# Patient Record
Sex: MALE | Race: WHITE | URBAN - METROPOLITAN AREA
[De-identification: names, ages, dates, MRNs, and addresses within clinical notes are randomized per-mention and may not be internally consistent; named-entity substitution may affect disease eponyms.]

---

## 2017-07-05 ENCOUNTER — OFFICE VISIT (OUTPATIENT)
Dept: FAMILY MEDICINE CLINIC | Facility: CLINIC | Age: 37
End: 2017-07-05

## 2017-07-05 VITALS
DIASTOLIC BLOOD PRESSURE: 70 MMHG | HEIGHT: 72.75 IN | HEART RATE: 80 BPM | TEMPERATURE: 99 F | RESPIRATION RATE: 16 BRPM | BODY MASS INDEX: 30.75 KG/M2 | WEIGHT: 232 LBS | OXYGEN SATURATION: 98 % | SYSTOLIC BLOOD PRESSURE: 108 MMHG

## 2017-07-05 DIAGNOSIS — Z00.00 ROUTINE GENERAL MEDICAL EXAMINATION AT A HEALTH CARE FACILITY: Primary | ICD-10-CM

## 2017-07-05 PROBLEM — F52.8 PSYCHOSEXUAL DYSFUNCTION WITH INHIBITED SEXUAL EXCITEMENT: Status: ACTIVE | Noted: 2017-07-05

## 2017-07-05 PROBLEM — I83.90 ASYMPTOMATIC VARICOSE VEINS: Status: ACTIVE | Noted: 2017-07-05

## 2017-07-05 PROBLEM — N52.9 VASCULOGENIC ERECTILE DYSFUNCTION: Status: ACTIVE | Noted: 2017-07-05

## 2017-07-05 PROBLEM — F17.200 TOBACCO USE DISORDER: Status: ACTIVE | Noted: 2017-07-05

## 2017-07-05 PROCEDURE — 99395 PREV VISIT EST AGE 18-39: CPT | Performed by: FAMILY MEDICINE

## 2017-07-05 RX ORDER — SULFAMETHOXAZOLE AND TRIMETHOPRIM 800; 160 MG/1; MG/1
1 TABLET ORAL 2 TIMES DAILY
Qty: 20 TABLET | Refills: 0 | Status: SHIPPED | OUTPATIENT
Start: 2017-07-05 | End: 2017-07-15

## 2017-07-05 NOTE — PATIENT INSTRUCTIONS
Good exam today     Get back to exercise routine. Healthy nutrition       Check fasting labs in near future. - will check thyroid, chemistry , testosterone.

## 2017-07-07 NOTE — PROGRESS NOTES
Chief Complaint:   Patient presents with:  Physical: new patient to get established      HPI:   This is a 43year old male coming in for health care check   Feeling well   Discussed heart dis. Prevention and ca early detection.      Come difficulty with ere Ht 72.75\"   Wt 232 lb   SpO2 98%   BMI 30.82 kg/m²  Estimated body mass index is 30.82 kg/m² as calculated from the following:    Height as of this encounter: 72.75\". Weight as of this encounter: 232 lb. Vital signs reviewed. Appears stated age, well of today.      Problem List:  Patient Active Problem List:     Viral warts     Asymptomatic varicose veins     Personal history of methicillin resistant Staphylococcus aureus     Tobacco use disorder     Routine general medical examination at [facility]

## 2017-07-12 ENCOUNTER — LABORATORY ENCOUNTER (OUTPATIENT)
Dept: LAB | Age: 37
End: 2017-07-12
Attending: FAMILY MEDICINE
Payer: COMMERCIAL

## 2017-07-12 DIAGNOSIS — Z00.00 ROUTINE GENERAL MEDICAL EXAMINATION AT A HEALTH CARE FACILITY: ICD-10-CM

## 2017-07-12 LAB
ALBUMIN SERPL-MCNC: 3.9 G/DL (ref 3.5–4.8)
ALP LIVER SERPL-CCNC: 51 U/L (ref 45–117)
ALT SERPL-CCNC: 26 U/L (ref 17–63)
AST SERPL-CCNC: 16 U/L (ref 15–41)
BASOPHILS # BLD AUTO: 0.04 X10(3) UL (ref 0–0.1)
BASOPHILS NFR BLD AUTO: 0.6 %
BILIRUB SERPL-MCNC: 0.9 MG/DL (ref 0.1–2)
BILIRUB UR QL STRIP.AUTO: NEGATIVE
BUN BLD-MCNC: 10 MG/DL (ref 8–20)
CALCIUM BLD-MCNC: 9.2 MG/DL (ref 8.3–10.3)
CHLORIDE: 106 MMOL/L (ref 101–111)
CHOLEST SMN-MCNC: 139 MG/DL (ref ?–200)
CLARITY UR REFRACT.AUTO: CLEAR
CO2: 27 MMOL/L (ref 22–32)
COLOR UR AUTO: YELLOW
CREAT BLD-MCNC: 1.52 MG/DL (ref 0.7–1.3)
EOSINOPHIL # BLD AUTO: 0.36 X10(3) UL (ref 0–0.3)
EOSINOPHIL NFR BLD AUTO: 5.5 %
ERYTHROCYTE [DISTWIDTH] IN BLOOD BY AUTOMATED COUNT: 12 % (ref 11.5–16)
GLUCOSE BLD-MCNC: 93 MG/DL (ref 70–99)
GLUCOSE UR STRIP.AUTO-MCNC: NEGATIVE MG/DL
HCT VFR BLD AUTO: 47.8 % (ref 37–53)
HDLC SERPL-MCNC: 31 MG/DL (ref 45–?)
HDLC SERPL: 4.48 {RATIO} (ref ?–4.97)
HGB BLD-MCNC: 15.9 G/DL (ref 13–17)
IMMATURE GRANULOCYTE COUNT: 0.03 X10(3) UL (ref 0–1)
IMMATURE GRANULOCYTE RATIO %: 0.5 %
KETONES UR STRIP.AUTO-MCNC: NEGATIVE MG/DL
LDLC SERPL CALC-MCNC: 93 MG/DL (ref ?–130)
LEUKOCYTE ESTERASE UR QL STRIP.AUTO: NEGATIVE
LYMPHOCYTES # BLD AUTO: 1.46 X10(3) UL (ref 0.9–4)
LYMPHOCYTES NFR BLD AUTO: 22.2 %
M PROTEIN MFR SERPL ELPH: 7.3 G/DL (ref 6.1–8.3)
MCH RBC QN AUTO: 30.8 PG (ref 27–33.2)
MCHC RBC AUTO-ENTMCNC: 33.3 G/DL (ref 31–37)
MCV RBC AUTO: 92.5 FL (ref 80–99)
MONOCYTES # BLD AUTO: 0.44 X10(3) UL (ref 0.1–0.6)
MONOCYTES NFR BLD AUTO: 6.7 %
NEUTROPHIL ABS PRELIM: 4.25 X10 (3) UL (ref 1.3–6.7)
NEUTROPHILS # BLD AUTO: 4.25 X10(3) UL (ref 1.3–6.7)
NEUTROPHILS NFR BLD AUTO: 64.5 %
NITRITE UR QL STRIP.AUTO: NEGATIVE
NONHDLC SERPL-MCNC: 108 MG/DL (ref ?–130)
PH UR STRIP.AUTO: 5 [PH] (ref 4.5–8)
PLATELET # BLD AUTO: 220 10(3)UL (ref 150–450)
POTASSIUM SERPL-SCNC: 4.1 MMOL/L (ref 3.6–5.1)
PROT UR STRIP.AUTO-MCNC: NEGATIVE MG/DL
RBC # BLD AUTO: 5.17 X10(6)UL (ref 4.3–5.7)
RBC UR QL AUTO: NEGATIVE
RED CELL DISTRIBUTION WIDTH-SD: 41 FL (ref 35.1–46.3)
SODIUM SERPL-SCNC: 140 MMOL/L (ref 136–144)
SP GR UR STRIP.AUTO: 1.02 (ref 1–1.03)
TRIGLYCERIDES: 75 MG/DL (ref ?–150)
TSI SER-ACNC: 0.38 MIU/ML (ref 0.35–5.5)
UROBILINOGEN UR STRIP.AUTO-MCNC: <2 MG/DL
VLDL: 15 MG/DL (ref 5–40)
WBC # BLD AUTO: 6.6 X10(3) UL (ref 4–13)

## 2017-07-12 PROCEDURE — 36415 COLL VENOUS BLD VENIPUNCTURE: CPT

## 2017-07-12 PROCEDURE — 84443 ASSAY THYROID STIM HORMONE: CPT

## 2017-07-12 PROCEDURE — 81003 URINALYSIS AUTO W/O SCOPE: CPT

## 2017-07-12 PROCEDURE — 85025 COMPLETE CBC W/AUTO DIFF WBC: CPT

## 2017-07-12 PROCEDURE — 80053 COMPREHEN METABOLIC PANEL: CPT

## 2017-07-12 PROCEDURE — 80061 LIPID PANEL: CPT

## 2017-07-14 ENCOUNTER — TELEPHONE (OUTPATIENT)
Dept: FAMILY MEDICINE CLINIC | Facility: CLINIC | Age: 37
End: 2017-07-14

## 2017-07-14 PROBLEM — N28.9 ACUTE RENAL INSUFFICIENCY: Status: ACTIVE | Noted: 2017-07-14

## 2017-07-14 NOTE — TELEPHONE ENCOUNTER
Patient states that he is taking creatine and will stop taking it. Patient states that Dr. Frances Holcomb was going to check his testosterone level, please advise.

## 2017-08-23 ENCOUNTER — TELEPHONE (OUTPATIENT)
Dept: FAMILY MEDICINE CLINIC | Facility: CLINIC | Age: 37
End: 2017-08-23

## 2017-08-23 NOTE — TELEPHONE ENCOUNTER
Patient states that he discussed having his testosterone level tested with his last lab draw. Patient was informed that the order for testosterone was not order at his last visit.     He states that he would like his testosterone level check so he can st

## 2017-08-29 ENCOUNTER — TELEPHONE (OUTPATIENT)
Dept: FAMILY MEDICINE CLINIC | Facility: CLINIC | Age: 37
End: 2017-08-29

## 2017-08-29 DIAGNOSIS — F32.2 MAJOR DEPRESSIVE DISORDER, SINGLE EPISODE, SEVERE (HCC): ICD-10-CM

## 2017-08-29 DIAGNOSIS — N28.9 ACUTE RENAL INSUFFICIENCY: ICD-10-CM

## 2017-08-29 DIAGNOSIS — N52.9 VASCULOGENIC ERECTILE DYSFUNCTION, UNSPECIFIED VASCULOGENIC ERECTILE DYSFUNCTION TYPE: Primary | ICD-10-CM

## 2017-08-29 NOTE — TELEPHONE ENCOUNTER
Patient states that he is going to schedule the follow up blood work and wanted to make sure that his testosterone level will be included in the order. Please advise.

## 2017-08-30 ENCOUNTER — APPOINTMENT (OUTPATIENT)
Dept: LAB | Age: 37
End: 2017-08-30
Attending: FAMILY MEDICINE
Payer: COMMERCIAL

## 2017-08-30 DIAGNOSIS — N28.9 ACUTE RENAL INSUFFICIENCY: ICD-10-CM

## 2017-08-30 DIAGNOSIS — N52.9 VASCULOGENIC ERECTILE DYSFUNCTION, UNSPECIFIED VASCULOGENIC ERECTILE DYSFUNCTION TYPE: ICD-10-CM

## 2017-08-30 DIAGNOSIS — F32.2 MAJOR DEPRESSIVE DISORDER, SINGLE EPISODE, SEVERE (HCC): ICD-10-CM

## 2017-08-30 LAB
ALBUMIN SERPL-MCNC: 4 G/DL (ref 3.5–4.8)
ALP LIVER SERPL-CCNC: 46 U/L (ref 45–117)
ALT SERPL-CCNC: 27 U/L (ref 17–63)
AST SERPL-CCNC: 19 U/L (ref 15–41)
BILIRUB SERPL-MCNC: 0.9 MG/DL (ref 0.1–2)
BUN BLD-MCNC: 8 MG/DL (ref 8–20)
CALCIUM BLD-MCNC: 9.4 MG/DL (ref 8.3–10.3)
CHLORIDE: 107 MMOL/L (ref 101–111)
CO2: 27 MMOL/L (ref 22–32)
CREAT BLD-MCNC: 1.13 MG/DL (ref 0.7–1.3)
GLUCOSE BLD-MCNC: 110 MG/DL (ref 70–99)
M PROTEIN MFR SERPL ELPH: 7.4 G/DL (ref 6.1–8.3)
POTASSIUM SERPL-SCNC: 3.8 MMOL/L (ref 3.6–5.1)
SODIUM SERPL-SCNC: 141 MMOL/L (ref 136–144)
TESTOSTERONE: 292.3 NG/DL (ref 241–827)
TSI SER-ACNC: 0.47 MIU/ML (ref 0.35–5.5)

## 2017-08-30 PROCEDURE — 84443 ASSAY THYROID STIM HORMONE: CPT

## 2017-08-30 PROCEDURE — 80053 COMPREHEN METABOLIC PANEL: CPT

## 2017-08-30 PROCEDURE — 36415 COLL VENOUS BLD VENIPUNCTURE: CPT

## 2017-08-30 PROCEDURE — 84403 ASSAY OF TOTAL TESTOSTERONE: CPT

## 2017-08-31 ENCOUNTER — TELEPHONE (OUTPATIENT)
Dept: FAMILY MEDICINE CLINIC | Facility: CLINIC | Age: 37
End: 2017-08-31

## 2017-08-31 NOTE — TELEPHONE ENCOUNTER
----- Message from Iain Tobias MD sent at 8/31/2017  6:47 AM CDT -----  Labs reviewed   Testosterone level normal   Kidney function improved. Recommend continue with drinking plenty of water . Continue off high protein diet and protein drinks.  Recom

## 2017-08-31 NOTE — TELEPHONE ENCOUNTER
Patient informed of the below results and recommenations. Patient will c/b to schedule a lab appt in 6 months.   Alejandro Arreaga, 08/31/17, 11:24 AM

## 2017-09-05 ENCOUNTER — TELEPHONE (OUTPATIENT)
Dept: FAMILY MEDICINE CLINIC | Facility: CLINIC | Age: 37
End: 2017-09-05

## 2017-09-05 NOTE — TELEPHONE ENCOUNTER
Patient states that he was put on a medication some time ago for ED and stopped taking it because it did not really need it. He states that he would like to start taking the viagra again. Patient states that it is alright to wait until tomorrow.   Please

## 2017-09-08 RX ORDER — SILDENAFIL 50 MG/1
50 TABLET, FILM COATED ORAL
Qty: 8 TABLET | Refills: 3 | Status: SHIPPED | OUTPATIENT
Start: 2017-09-08 | End: 2017-09-11 | Stop reason: ALTCHOICE

## 2017-09-09 ENCOUNTER — TELEPHONE (OUTPATIENT)
Dept: FAMILY MEDICINE CLINIC | Facility: CLINIC | Age: 37
End: 2017-09-09

## 2017-09-09 NOTE — TELEPHONE ENCOUNTER
Prescription for Viagra was sent to [facility] in [place].  Insurance wants the generic prescription to be filled instead

## 2017-09-11 RX ORDER — TADALAFIL 20 MG/1
20 TABLET ORAL AS NEEDED
Qty: 24 TABLET | Refills: 3 | Status: SHIPPED | OUTPATIENT
Start: 2017-09-11 | End: 2020-11-12

## 2017-09-11 NOTE — TELEPHONE ENCOUNTER
Patient states that the pharmacy told him that viagra will not be covered through his insurance but, cialis will be covered. He would like to know if  Dr. Sarah Altman can send a new script to the pharmacy, please advise.

## 2017-09-12 ENCOUNTER — TELEPHONE (OUTPATIENT)
Dept: FAMILY MEDICINE CLINIC | Facility: CLINIC | Age: 37
End: 2017-09-12

## 2017-09-12 NOTE — TELEPHONE ENCOUNTER
Patient informed that he insurance will not cover the cialis and a prior auth was done to see if we can get it covered. Patient also informed that we do not have a cost saving cards. Patient to await result of prior auth.

## 2017-09-13 ENCOUNTER — TELEPHONE (OUTPATIENT)
Dept: FAMILY MEDICINE CLINIC | Facility: CLINIC | Age: 37
End: 2017-09-13

## 2017-09-13 NOTE — TELEPHONE ENCOUNTER
Patient informed that the cialis is the preferred drug for his insurance and he should try that as per Dr. Gila Fuchs.     Patient agreed and will  from the pharmacy

## 2017-09-13 NOTE — TELEPHONE ENCOUNTER
Patient states that he was following up on the viagra/cialis refill. Patient informed that the prior auth was started for the viagra and we should hear something back within 24 to 48 hours. Patient states that he understands.

## 2017-09-13 NOTE — TELEPHONE ENCOUNTER
----- Message from Idalia Rushing sent at 9/13/2017  9:44 AM CDT -----  Contact: patient   Returned your call and can be reached back at [phone].  Thank you

## 2017-09-13 NOTE — TELEPHONE ENCOUNTER
----- Message from Ute Garcia sent at 9/13/2017  9:44 AM CDT -----  Contact: patient   Returned your call and can be reached back at [phone].  Thank you

## 2018-03-12 ENCOUNTER — TELEPHONE (OUTPATIENT)
Dept: FAMILY MEDICINE CLINIC | Facility: CLINIC | Age: 38
End: 2018-03-12

## 2018-03-12 NOTE — TELEPHONE ENCOUNTER
Patient been having stomach issues since Thursday.  has had a lot of gas.  has diarrhea and  feels like a lot of water coming out. Advise patient mke appt.  needs appt after 4 he is at work. No appts available with Dr. Jamshid Delgado.

## 2018-03-28 ENCOUNTER — HOSPITAL ENCOUNTER (OUTPATIENT)
Dept: GENERAL RADIOLOGY | Age: 38
Discharge: HOME | End: 2018-03-28
Attending: FAMILY MEDICINE
Payer: COMMERCIAL

## 2018-03-28 ENCOUNTER — OFFICE VISIT (OUTPATIENT)
Dept: FAMILY MEDICINE CLINIC | Facility: CLINIC | Age: 38
End: 2018-03-28

## 2018-03-28 VITALS
SYSTOLIC BLOOD PRESSURE: 98 MMHG | BODY MASS INDEX: 30.39 KG/M2 | WEIGHT: 226.81 LBS | HEIGHT: 72.25 IN | HEART RATE: 80 BPM | TEMPERATURE: 100 F | DIASTOLIC BLOOD PRESSURE: 70 MMHG | OXYGEN SATURATION: 95 % | RESPIRATION RATE: 16 BRPM

## 2018-03-28 DIAGNOSIS — R10.84 GENERALIZED ABDOMINAL PAIN: ICD-10-CM

## 2018-03-28 DIAGNOSIS — R10.84 GENERALIZED ABDOMINAL PAIN: Primary | ICD-10-CM

## 2018-03-28 DIAGNOSIS — K52.9 GASTROENTERITIS, ACUTE: ICD-10-CM

## 2018-03-28 LAB
ALBUMIN SERPL-MCNC: 4.1 G/DL (ref 3.5–4.8)
ALP LIVER SERPL-CCNC: 52 U/L (ref 45–117)
ALT SERPL-CCNC: 25 U/L (ref 17–63)
AST SERPL-CCNC: 24 U/L (ref 15–41)
BASOPHILS # BLD AUTO: 0.03 X10(3) UL (ref 0–0.1)
BASOPHILS NFR BLD AUTO: 0.2 %
BILIRUB SERPL-MCNC: 1.1 MG/DL (ref 0.1–2)
BUN BLD-MCNC: 11 MG/DL (ref 8–20)
CALCIUM BLD-MCNC: 9.5 MG/DL (ref 8.3–10.3)
CHLORIDE: 107 MMOL/L (ref 101–111)
CO2: 24 MMOL/L (ref 22–32)
CREAT BLD-MCNC: 1.25 MG/DL (ref 0.7–1.3)
EOSINOPHIL # BLD AUTO: 0.03 X10(3) UL (ref 0–0.3)
EOSINOPHIL NFR BLD AUTO: 0.2 %
ERYTHROCYTE [DISTWIDTH] IN BLOOD BY AUTOMATED COUNT: 12.1 % (ref 11.5–16)
GLUCOSE BLD-MCNC: 130 MG/DL (ref 70–99)
HCT VFR BLD AUTO: 47.8 % (ref 37–53)
HGB BLD-MCNC: 16.6 G/DL (ref 13–17)
IMMATURE GRANULOCYTE COUNT: 0.05 X10(3) UL (ref 0–1)
IMMATURE GRANULOCYTE RATIO %: 0.4 %
LYMPHOCYTES # BLD AUTO: 0.31 X10(3) UL (ref 0.9–4)
LYMPHOCYTES NFR BLD AUTO: 2.3 %
M PROTEIN MFR SERPL ELPH: 7.7 G/DL (ref 6.1–8.3)
MCH RBC QN AUTO: 32 PG (ref 27–33.2)
MCHC RBC AUTO-ENTMCNC: 34.7 G/DL (ref 31–37)
MCV RBC AUTO: 92.1 FL (ref 80–99)
MONOCYTES # BLD AUTO: 0.39 X10(3) UL (ref 0.1–1)
MONOCYTES NFR BLD AUTO: 2.8 %
NEUTROPHIL ABS PRELIM: 12.96 X10 (3) UL (ref 1.3–6.7)
NEUTROPHILS # BLD AUTO: 12.96 X10(3) UL (ref 1.3–6.7)
NEUTROPHILS NFR BLD AUTO: 94.1 %
PLATELET # BLD AUTO: 197 10(3)UL (ref 150–450)
POTASSIUM SERPL-SCNC: 4.1 MMOL/L (ref 3.6–5.1)
RBC # BLD AUTO: 5.19 X10(6)UL (ref 4.3–5.7)
RED CELL DISTRIBUTION WIDTH-SD: 41 FL (ref 35.1–46.3)
SODIUM SERPL-SCNC: 138 MMOL/L (ref 136–144)
WBC # BLD AUTO: 13.8 X10(3) UL (ref 4–13)

## 2018-03-28 PROCEDURE — 80053 COMPREHEN METABOLIC PANEL: CPT | Performed by: FAMILY MEDICINE

## 2018-03-28 PROCEDURE — 99214 OFFICE O/P EST MOD 30 MIN: CPT | Performed by: FAMILY MEDICINE

## 2018-03-28 PROCEDURE — 85025 COMPLETE CBC W/AUTO DIFF WBC: CPT | Performed by: FAMILY MEDICINE

## 2018-03-28 PROCEDURE — 74019 RADEX ABDOMEN 2 VIEWS: CPT | Performed by: FAMILY MEDICINE

## 2018-03-28 RX ORDER — CIPROFLOXACIN 250 MG/1
500 TABLET, FILM COATED ORAL 2 TIMES DAILY
Qty: 20 TABLET | Refills: 0 | Status: SHIPPED | OUTPATIENT
Start: 2018-03-28 | End: 2018-04-07

## 2018-03-28 NOTE — PATIENT INSTRUCTIONS
Start antibiotic     Tylenol for discomfort. Consider benadryl one tab three times a day as needed for nausea.

## 2018-03-29 ENCOUNTER — TELEPHONE (OUTPATIENT)
Dept: FAMILY MEDICINE CLINIC | Facility: CLINIC | Age: 38
End: 2018-03-29

## 2018-03-29 NOTE — TELEPHONE ENCOUNTER
----- Message from Arsen Lucas MD sent at 3/29/2018  2:11 PM CDT -----  Labs reviewed     Elevated WBC and elevated neutrophil count - consistent with bacterial infection as I had suspected at OV. Continue with current treatment regimen.

## 2018-03-29 NOTE — TELEPHONE ENCOUNTER
Informed pt of his blood work results. Pt would like to know if he still has to go for ultrasound gallbladder? Please advise.

## 2018-03-29 NOTE — TELEPHONE ENCOUNTER
I do not feel that Ultrasound is warranted at this time. No lab tests indicate any problem with gall bladder.

## 2018-03-30 NOTE — TELEPHONE ENCOUNTER
Xray report was same as discussed at [address] No acute findings. Normal gas pattern   No obstructive findings.

## 2018-03-30 NOTE — TELEPHONE ENCOUNTER
Patient informed of below. Expressed understanding. Requesting Results of Abdominal XRay. Please advise.   Melecio Morales, 03/30/18, 10:35 AM

## 2018-03-30 NOTE — TELEPHONE ENCOUNTER
----- Message from Harlo Osler sent at 3/29/2018  3:00 PM CDT -----  Contact: patient  jennifer - please call him at   [phone]

## 2018-04-11 ENCOUNTER — OFFICE VISIT (OUTPATIENT)
Dept: FAMILY MEDICINE CLINIC | Facility: CLINIC | Age: 38
End: 2018-04-11

## 2018-04-11 VITALS
BODY MASS INDEX: 29.5 KG/M2 | TEMPERATURE: 98 F | HEIGHT: 72.75 IN | WEIGHT: 222.63 LBS | SYSTOLIC BLOOD PRESSURE: 126 MMHG | OXYGEN SATURATION: 95 % | DIASTOLIC BLOOD PRESSURE: 72 MMHG | HEART RATE: 88 BPM | RESPIRATION RATE: 16 BRPM

## 2018-04-11 DIAGNOSIS — D72.825 BANDEMIA: ICD-10-CM

## 2018-04-11 DIAGNOSIS — K52.9 GASTROENTERITIS, ACUTE: Primary | ICD-10-CM

## 2018-04-11 PROCEDURE — 99213 OFFICE O/P EST LOW 20 MIN: CPT | Performed by: FAMILY MEDICINE

## 2018-04-11 NOTE — PROGRESS NOTES
Chief Complaint:   Patient presents with:   Follow - Up  Lump: On left side of chest      HPI:   This is a 42year old male coming in for follow-up care regarding his abdominal discomfort suspected gastroenteritis with bacterial cause with elevated white bl Medical History (reviewed - no changes required):   ED  MRSA nares  Depression     Surgical History (reviewed - no changes required):   L Ankle Surgery  17 teeth removed 1/12     Family History (reviewed - no changes required):   Father alive age 54; found alert, awake and oriented, well developed, well nourished  male   , ill appearing , emesis in room.     HEENT:  Head:  Normocephalic, atraumatic     NECK: Supple,  no JVD, no thyromegaly.   SKIN: No rashes, no skin lesion, no bruising, good turgor.     HEAR

## 2018-04-11 NOTE — PATIENT INSTRUCTIONS
Good exam     Focus on healthy nutrition , drink plenty of water. Recheck of fasting labs in near future.  Followed by appointment

## 2018-05-24 ENCOUNTER — TELEPHONE (OUTPATIENT)
Dept: FAMILY MEDICINE CLINIC | Facility: CLINIC | Age: 38
End: 2018-05-24

## 2018-07-31 ENCOUNTER — TELEPHONE (OUTPATIENT)
Dept: FAMILY MEDICINE CLINIC | Facility: CLINIC | Age: 38
End: 2018-07-31

## 2018-07-31 NOTE — TELEPHONE ENCOUNTER
VANII - Pt called stating that he has been experiencing sob on exertion. He denies any chest pain. He states that Dr. Shyla Davis prescribed him an inhaler when this happened before. He states it was a few years ago.  Pt advised to [facility]

## 2018-07-31 NOTE — TELEPHONE ENCOUNTER
Patient has breathing issues when moving and at nights, before patient was using inhaler but stopped, wants to know if dr can prescribe it again or if he should make an appointment to see the dr first

## 2018-08-02 ENCOUNTER — HOSPITAL ENCOUNTER (OUTPATIENT)
Dept: GENERAL RADIOLOGY | Age: 38
Discharge: HOME | End: 2018-08-02
Attending: FAMILY MEDICINE
Payer: COMMERCIAL

## 2018-08-02 ENCOUNTER — NURSE ONLY (OUTPATIENT)
Dept: FAMILY MEDICINE CLINIC | Facility: CLINIC | Age: 38
End: 2018-08-02
Payer: COMMERCIAL

## 2018-08-02 ENCOUNTER — OFFICE VISIT (OUTPATIENT)
Dept: FAMILY MEDICINE CLINIC | Facility: CLINIC | Age: 38
End: 2018-08-02
Payer: COMMERCIAL

## 2018-08-02 VITALS
WEIGHT: 232.81 LBS | HEART RATE: 86 BPM | OXYGEN SATURATION: 99 % | RESPIRATION RATE: 18 BRPM | DIASTOLIC BLOOD PRESSURE: 86 MMHG | SYSTOLIC BLOOD PRESSURE: 112 MMHG | BODY MASS INDEX: 30.85 KG/M2 | HEIGHT: 72.75 IN | TEMPERATURE: 99 F

## 2018-08-02 DIAGNOSIS — R06.02 SHORTNESS OF BREATH: Primary | ICD-10-CM

## 2018-08-02 DIAGNOSIS — B07.9 VIRAL WARTS, UNSPECIFIED TYPE: ICD-10-CM

## 2018-08-02 DIAGNOSIS — R06.2 WHEEZING: ICD-10-CM

## 2018-08-02 DIAGNOSIS — J30.2 SEASONAL ALLERGIC RHINITIS, UNSPECIFIED TRIGGER: ICD-10-CM

## 2018-08-02 DIAGNOSIS — R06.02 SHORTNESS OF BREATH: ICD-10-CM

## 2018-08-02 PROCEDURE — 99214 OFFICE O/P EST MOD 30 MIN: CPT | Performed by: FAMILY MEDICINE

## 2018-08-02 PROCEDURE — 94060 EVALUATION OF WHEEZING: CPT | Performed by: FAMILY MEDICINE

## 2018-08-02 PROCEDURE — 71046 X-RAY EXAM CHEST 2 VIEWS: CPT | Performed by: FAMILY MEDICINE

## 2018-08-02 RX ORDER — MONTELUKAST SODIUM 10 MG/1
10 TABLET ORAL NIGHTLY
Qty: 30 TABLET | Refills: 0 | Status: SHIPPED | OUTPATIENT
Start: 2018-08-02 | End: 2018-08-29

## 2018-08-02 RX ORDER — ALBUTEROL SULFATE 90 UG/1
1 AEROSOL, METERED RESPIRATORY (INHALATION) EVERY 4 HOURS PRN
Qty: 1 INHALER | Refills: 0 | Status: SHIPPED | OUTPATIENT
Start: 2018-08-02 | End: 2018-08-29

## 2018-08-02 NOTE — PATIENT INSTRUCTIONS
Multiple warts frozen today. Start albuterol inhaler and sigulair. Check PFT and chest xray today. Return to clinic if no improvement.

## 2018-08-29 DIAGNOSIS — R06.2 WHEEZING: ICD-10-CM

## 2018-08-30 RX ORDER — MONTELUKAST SODIUM 10 MG/1
TABLET ORAL
Qty: 30 TABLET | Refills: 0 | Status: SHIPPED | OUTPATIENT
Start: 2018-08-30 | End: 2018-09-13

## 2018-08-30 NOTE — TELEPHONE ENCOUNTER
Future appt:    Last Appointment:  8/2/2018; No f/u recommended    Cholesterol, Total (mg/dL)   Date Value   07/12/2017 139   ----------  HDL Cholesterol (mg/dL)   Date Value   07/12/2017 31 (L)   ----------  LDL Cholesterol (mg/dL)   Date Value   07/12/20

## 2018-09-12 NOTE — TELEPHONE ENCOUNTER
Future appt:    Last Appointment:  8/2/2018  Cholesterol, Total (mg/dL)   Date Value   07/12/2017 139     HDL Cholesterol (mg/dL)   Date Value   07/12/2017 31 (L)     LDL Cholesterol (mg/dL)   Date Value   07/12/2017 93     Triglycerides (mg/dL)   Date Maris

## 2018-09-13 RX ORDER — MONTELUKAST SODIUM 10 MG/1
TABLET ORAL
Qty: 30 TABLET | Refills: 0 | Status: SHIPPED | OUTPATIENT
Start: 2018-09-13 | End: 2018-10-09

## 2018-09-13 NOTE — TELEPHONE ENCOUNTER
Left detailed message informing pt of the below.  Stated to call back to make appointment for physical.

## 2018-09-19 ENCOUNTER — OFFICE VISIT (OUTPATIENT)
Dept: FAMILY MEDICINE CLINIC | Facility: CLINIC | Age: 38
End: 2018-09-19
Payer: COMMERCIAL

## 2018-09-19 VITALS
BODY MASS INDEX: 29.46 KG/M2 | RESPIRATION RATE: 16 BRPM | TEMPERATURE: 98 F | WEIGHT: 232 LBS | SYSTOLIC BLOOD PRESSURE: 116 MMHG | HEIGHT: 74.5 IN | DIASTOLIC BLOOD PRESSURE: 78 MMHG | HEART RATE: 64 BPM

## 2018-09-19 DIAGNOSIS — Z00.00 HEALTH CARE MAINTENANCE: Primary | ICD-10-CM

## 2018-09-19 PROBLEM — F17.200 TOBACCO USE DISORDER: Status: RESOLVED | Noted: 2017-07-05 | Resolved: 2018-09-19

## 2018-09-19 PROCEDURE — 90686 IIV4 VACC NO PRSV 0.5 ML IM: CPT | Performed by: FAMILY MEDICINE

## 2018-09-19 PROCEDURE — 90471 IMMUNIZATION ADMIN: CPT | Performed by: FAMILY MEDICINE

## 2018-09-19 PROCEDURE — 99395 PREV VISIT EST AGE 18-39: CPT | Performed by: FAMILY MEDICINE

## 2018-09-19 NOTE — PATIENT INSTRUCTIONS
Good exam today     Continue with inhaler as needed     Continue with singulair. to help prevent asthma flare.

## 2018-09-19 NOTE — PROGRESS NOTES
Chief Complaint:   Patient presents with:  Physical: f/u on breathing issues      HPI:   This is a 42year old male coming in for healthcare check and follow-up on breathing issues patient with issues of allergy symptoms. Patient was started on Singulair. shortness of breath, wheezing, cough or sputum production. GASTROINTESTINAL:  Denies abdominal pain, nausea, vomiting, constipation, diarrhea  : denies dysuria, no urinary frequency , no discharge.   MUSCULOSKELETAL:  Denies weakness, muscle aches,   KENDRICK maintenance      Patient Instructions   Good exam today     Continue with inhaler as needed     Continue with singulair. to help prevent asthma flare. Patient/Caregiver Education: Patient/Caregiver Education: There are no barriers to learning.  Med

## 2018-10-09 RX ORDER — MONTELUKAST SODIUM 10 MG/1
TABLET ORAL
Qty: 30 TABLET | Refills: 2 | Status: SHIPPED | OUTPATIENT
Start: 2018-10-09 | End: 2019-01-21

## 2018-10-09 NOTE — TELEPHONE ENCOUNTER
Future appt:    Last Appointment:  9/19/2018; No f/u recommended    Cholesterol, Total (mg/dL)   Date Value   07/12/2017 139     HDL Cholesterol (mg/dL)   Date Value   07/12/2017 31 (L)     LDL Cholesterol (mg/dL)   Date Value   07/12/2017 93     Triglycer

## 2019-01-21 RX ORDER — MONTELUKAST SODIUM 10 MG/1
TABLET ORAL
Qty: 30 TABLET | Refills: 2 | Status: SHIPPED | OUTPATIENT
Start: 2019-01-21 | End: 2019-11-13

## 2019-01-21 NOTE — TELEPHONE ENCOUNTER
Future appt:    Last Appointment:  9/19/2018; No f/u recommended    Cholesterol, Total (mg/dL)   Date Value   07/12/2017 139     HDL Cholesterol (mg/dL)   Date Value   07/12/2017 31 (L)     LDL Cholesterol (mg/dL)   Date Value   07/12/2017 93     Triglycerides (mg/dL)   Date Value   07/12/2017 75     No results found for: EAG, A1C  Lab Results   Component Value Date    TSH 0.473 08/30/2017     Last RF:  10/9/2018    No Follow-up on file.

## 2019-04-03 ENCOUNTER — TELEPHONE (OUTPATIENT)
Dept: FAMILY MEDICINE CLINIC | Facility: CLINIC | Age: 39
End: 2019-04-03

## 2019-04-03 NOTE — TELEPHONE ENCOUNTER
Patient states he feels like there is a knot in his throat. States that it comes and goes. Patient is wondering if it could be acid reflux. Patient denies any burning in his chest or throat. States it does not get worse when he lays down.  I informed robbie

## 2019-08-01 ENCOUNTER — APPOINTMENT (OUTPATIENT)
Dept: LAB | Age: 39
End: 2019-08-01
Attending: NURSE PRACTITIONER
Payer: COMMERCIAL

## 2019-08-01 ENCOUNTER — OFFICE VISIT (OUTPATIENT)
Dept: FAMILY MEDICINE CLINIC | Facility: CLINIC | Age: 39
End: 2019-08-01
Payer: COMMERCIAL

## 2019-08-01 VITALS
HEART RATE: 73 BPM | SYSTOLIC BLOOD PRESSURE: 104 MMHG | HEIGHT: 74.5 IN | OXYGEN SATURATION: 95 % | WEIGHT: 218.81 LBS | TEMPERATURE: 99 F | BODY MASS INDEX: 27.78 KG/M2 | RESPIRATION RATE: 16 BRPM | DIASTOLIC BLOOD PRESSURE: 70 MMHG

## 2019-08-01 DIAGNOSIS — R60.9 EDEMA, PERIPHERAL: Primary | ICD-10-CM

## 2019-08-01 DIAGNOSIS — R53.83 FATIGUE, UNSPECIFIED TYPE: ICD-10-CM

## 2019-08-01 DIAGNOSIS — R06.02 SHORTNESS OF BREATH: ICD-10-CM

## 2019-08-01 LAB
ALBUMIN SERPL-MCNC: 4 G/DL (ref 3.4–5)
ALBUMIN/GLOB SERPL: 1.3 {RATIO} (ref 1–2)
ALP LIVER SERPL-CCNC: 51 U/L (ref 45–117)
ALT SERPL-CCNC: 35 U/L (ref 16–61)
ANION GAP SERPL CALC-SCNC: 7 MMOL/L (ref 0–18)
AST SERPL-CCNC: 31 U/L (ref 15–37)
BASOPHILS # BLD AUTO: 0.04 X10(3) UL (ref 0–0.2)
BASOPHILS NFR BLD AUTO: 0.4 %
BILIRUB SERPL-MCNC: 0.7 MG/DL (ref 0.1–2)
BUN BLD-MCNC: 26 MG/DL (ref 7–18)
BUN/CREAT SERPL: 19.7 (ref 10–20)
CALCIUM BLD-MCNC: 8.8 MG/DL (ref 8.5–10.1)
CHLORIDE SERPL-SCNC: 106 MMOL/L (ref 98–112)
CO2 SERPL-SCNC: 27 MMOL/L (ref 21–32)
CREAT BLD-MCNC: 1.32 MG/DL (ref 0.7–1.3)
DEPRECATED HBV CORE AB SER IA-ACNC: 98.2 NG/ML (ref 30–490)
DEPRECATED RDW RBC AUTO: 42.6 FL (ref 35.1–46.3)
EOSINOPHIL # BLD AUTO: 0.21 X10(3) UL (ref 0–0.7)
EOSINOPHIL NFR BLD AUTO: 2.4 %
ERYTHROCYTE [DISTWIDTH] IN BLOOD BY AUTOMATED COUNT: 12.1 % (ref 11–15)
GLOBULIN PLAS-MCNC: 3 G/DL (ref 2.8–4.4)
GLUCOSE BLD-MCNC: 88 MG/DL (ref 70–99)
HCT VFR BLD AUTO: 42.7 % (ref 39–53)
HGB BLD-MCNC: 14.3 G/DL (ref 13–17.5)
IMM GRANULOCYTES # BLD AUTO: 0.03 X10(3) UL (ref 0–1)
IMM GRANULOCYTES NFR BLD: 0.3 %
IRON SATURATION: 29 % (ref 20–50)
IRON SERPL-MCNC: 118 UG/DL (ref 65–175)
LYMPHOCYTES # BLD AUTO: 1.67 X10(3) UL (ref 1–4)
LYMPHOCYTES NFR BLD AUTO: 18.8 %
M PROTEIN MFR SERPL ELPH: 7 G/DL (ref 6.4–8.2)
MCH RBC QN AUTO: 32.2 PG (ref 26–34)
MCHC RBC AUTO-ENTMCNC: 33.5 G/DL (ref 31–37)
MCV RBC AUTO: 96.2 FL (ref 80–100)
MONOCYTES # BLD AUTO: 0.7 X10(3) UL (ref 0.1–1)
MONOCYTES NFR BLD AUTO: 7.9 %
NEUTROPHILS # BLD AUTO: 6.25 X10 (3) UL (ref 1.5–7.7)
NEUTROPHILS # BLD AUTO: 6.25 X10(3) UL (ref 1.5–7.7)
NEUTROPHILS NFR BLD AUTO: 70.2 %
OSMOLALITY SERPL CALC.SUM OF ELEC: 294 MOSM/KG (ref 275–295)
PLATELET # BLD AUTO: 191 10(3)UL (ref 150–450)
POTASSIUM SERPL-SCNC: 4.1 MMOL/L (ref 3.5–5.1)
RBC # BLD AUTO: 4.44 X10(6)UL (ref 4.3–5.7)
SODIUM SERPL-SCNC: 140 MMOL/L (ref 136–145)
TOTAL IRON BINDING CAPACITY: 405 UG/DL (ref 240–450)
TRANSFERRIN SERPL-MCNC: 272 MG/DL (ref 200–360)
VIT B12 SERPL-MCNC: 727 PG/ML (ref 193–986)
VIT D+METAB SERPL-MCNC: 26.1 NG/ML (ref 30–100)
WBC # BLD AUTO: 8.9 X10(3) UL (ref 4–11)

## 2019-08-01 PROCEDURE — 82728 ASSAY OF FERRITIN: CPT | Performed by: NURSE PRACTITIONER

## 2019-08-01 PROCEDURE — 83540 ASSAY OF IRON: CPT | Performed by: NURSE PRACTITIONER

## 2019-08-01 PROCEDURE — 85025 COMPLETE CBC W/AUTO DIFF WBC: CPT | Performed by: NURSE PRACTITIONER

## 2019-08-01 PROCEDURE — 36415 COLL VENOUS BLD VENIPUNCTURE: CPT | Performed by: NURSE PRACTITIONER

## 2019-08-01 PROCEDURE — 82607 VITAMIN B-12: CPT | Performed by: NURSE PRACTITIONER

## 2019-08-01 PROCEDURE — 93000 ELECTROCARDIOGRAM COMPLETE: CPT | Performed by: NURSE PRACTITIONER

## 2019-08-01 PROCEDURE — 82306 VITAMIN D 25 HYDROXY: CPT | Performed by: NURSE PRACTITIONER

## 2019-08-01 PROCEDURE — 83550 IRON BINDING TEST: CPT | Performed by: NURSE PRACTITIONER

## 2019-08-01 PROCEDURE — 99214 OFFICE O/P EST MOD 30 MIN: CPT | Performed by: NURSE PRACTITIONER

## 2019-08-01 PROCEDURE — 80053 COMPREHEN METABOLIC PANEL: CPT | Performed by: NURSE PRACTITIONER

## 2019-08-01 NOTE — PATIENT INSTRUCTIONS
EKG normal  Labs pending. Focus on healthy eating. Limit sodium in diet. If increase shortness of breath or chest pains, go to the ER. Recommend a full physical with Dr. Solange Fernando in the near future.

## 2019-08-01 NOTE — PROGRESS NOTES
HPI:    Patient ID: Rhonda Persaud is a 34year old male. HPI     States that he is really fatigued and that he is getting more swelling to his legs. Symptoms for the past week. Has been trying different diets. Has been doing keto diet.  - lost weight kg/m². Physical Exam   Constitutional: He is oriented to person, place, and time. He appears well-developed and well-nourished. No distress. HENT:   Head: Normocephalic and atraumatic.    Right Ear: Tympanic membrane, external ear and ear canal abraham

## 2019-08-02 ENCOUNTER — TELEPHONE (OUTPATIENT)
Dept: FAMILY MEDICINE CLINIC | Facility: CLINIC | Age: 39
End: 2019-08-02

## 2019-08-02 PROBLEM — E55.9 VITAMIN D DEFICIENCY: Status: ACTIVE | Noted: 2019-08-02

## 2019-08-02 RX ORDER — ERGOCALCIFEROL 1.25 MG/1
50000 CAPSULE ORAL WEEKLY
Qty: 12 CAPSULE | Refills: 0 | Status: SHIPPED | OUTPATIENT
Start: 2019-08-02 | End: 2019-11-02

## 2019-08-02 NOTE — TELEPHONE ENCOUNTER
----- Message from RASHAWN Allred sent at 8/2/2019  3:23 PM CDT -----  Please let patient know that labs show a slight decrease in kidney function. Recommend to increase water intake to at least 64 ounces daily.     Iron levels are a low normal.  Re

## 2019-08-02 NOTE — TELEPHONE ENCOUNTER
Let pt know the following below. Pt verbalized his understanding and had no other questions at this time. Rx sent.

## 2019-10-24 RX ORDER — ERGOCALCIFEROL 1.25 MG/1
CAPSULE ORAL
Qty: 12 CAPSULE | Refills: 0 | OUTPATIENT
Start: 2019-10-24

## 2019-11-13 ENCOUNTER — OFFICE VISIT (OUTPATIENT)
Dept: FAMILY MEDICINE CLINIC | Facility: CLINIC | Age: 39
End: 2019-11-13
Payer: COMMERCIAL

## 2019-11-13 ENCOUNTER — APPOINTMENT (OUTPATIENT)
Dept: LAB | Age: 39
End: 2019-11-13
Attending: NURSE PRACTITIONER
Payer: COMMERCIAL

## 2019-11-13 VITALS
SYSTOLIC BLOOD PRESSURE: 112 MMHG | OXYGEN SATURATION: 96 % | TEMPERATURE: 96 F | DIASTOLIC BLOOD PRESSURE: 80 MMHG | HEART RATE: 65 BPM | RESPIRATION RATE: 18 BRPM

## 2019-11-13 DIAGNOSIS — N28.9 ACUTE RENAL INSUFFICIENCY: ICD-10-CM

## 2019-11-13 DIAGNOSIS — N52.9 VASCULOGENIC ERECTILE DYSFUNCTION, UNSPECIFIED VASCULOGENIC ERECTILE DYSFUNCTION TYPE: ICD-10-CM

## 2019-11-13 DIAGNOSIS — Z00.00 HEALTH CARE MAINTENANCE: Primary | ICD-10-CM

## 2019-11-13 DIAGNOSIS — I83.813 VARICOSE VEINS OF BOTH LOWER EXTREMITIES WITH PAIN: ICD-10-CM

## 2019-11-13 PROCEDURE — 99396 PREV VISIT EST AGE 40-64: CPT | Performed by: NURSE PRACTITIONER

## 2019-11-13 PROCEDURE — 80053 COMPREHEN METABOLIC PANEL: CPT | Performed by: NURSE PRACTITIONER

## 2019-11-13 PROCEDURE — 83550 IRON BINDING TEST: CPT | Performed by: NURSE PRACTITIONER

## 2019-11-13 PROCEDURE — 90686 IIV4 VACC NO PRSV 0.5 ML IM: CPT | Performed by: NURSE PRACTITIONER

## 2019-11-13 PROCEDURE — 83540 ASSAY OF IRON: CPT | Performed by: NURSE PRACTITIONER

## 2019-11-13 PROCEDURE — 90471 IMMUNIZATION ADMIN: CPT | Performed by: NURSE PRACTITIONER

## 2019-11-13 PROCEDURE — 82728 ASSAY OF FERRITIN: CPT | Performed by: NURSE PRACTITIONER

## 2019-11-13 PROCEDURE — 82306 VITAMIN D 25 HYDROXY: CPT | Performed by: NURSE PRACTITIONER

## 2019-11-13 PROCEDURE — 36415 COLL VENOUS BLD VENIPUNCTURE: CPT | Performed by: NURSE PRACTITIONER

## 2019-11-13 PROCEDURE — 99213 OFFICE O/P EST LOW 20 MIN: CPT | Performed by: NURSE PRACTITIONER

## 2019-11-13 RX ORDER — MONTELUKAST SODIUM 10 MG/1
10 TABLET ORAL NIGHTLY
Qty: 90 TABLET | Refills: 3 | Status: SHIPPED | OUTPATIENT
Start: 2019-11-13 | End: 2020-11-12

## 2019-11-13 RX ORDER — SILDENAFIL 50 MG/1
50 TABLET, FILM COATED ORAL
Qty: 27 TABLET | Refills: 3 | Status: SHIPPED | OUTPATIENT
Start: 2019-11-13 | End: 2021-02-04

## 2019-11-13 NOTE — PATIENT INSTRUCTIONS
Repeat blood today- previous orders for Selena       flu shot today     Try Viagra  Follow up with Dr. Arlette Sales if not helpful. Consider compression stockings - or consult [facility].

## 2019-11-14 ENCOUNTER — TELEPHONE (OUTPATIENT)
Dept: FAMILY MEDICINE CLINIC | Facility: CLINIC | Age: 39
End: 2019-11-14

## 2019-11-14 DIAGNOSIS — R74.8 ELEVATED LIVER ENZYMES: Primary | ICD-10-CM

## 2019-11-14 NOTE — TELEPHONE ENCOUNTER
----- Message from RASHAWN Alvarez sent at 11/14/2019  9:23 AM CST -----  Please let patient know that his vitamin D and iron levels are great. Continue supplementation. Liver enzymes are extremely elevated. Please check with patient about alcohol and Tylenol usage. Thank you.

## 2019-11-14 NOTE — TELEPHONE ENCOUNTER
----- Message from RASHAWN Frazier sent at 11/14/2019  9:23 AM CST -----  Please let patient know that his vitamin D and iron levels are great. Continue supplementation. Liver enzymes are extremely elevated.   Please check with patient about alcohol

## 2019-11-14 NOTE — TELEPHONE ENCOUNTER
Spoke with patient and he was notified of results. Pt denies using any alcohol and only uses Naproxen as needed for pain. Selena Bruno notified and will order f/u labs.

## 2019-11-21 ENCOUNTER — TELEPHONE (OUTPATIENT)
Dept: FAMILY MEDICINE CLINIC | Facility: CLINIC | Age: 39
End: 2019-11-21

## 2019-11-21 DIAGNOSIS — E55.9 VITAMIN D DEFICIENCY: ICD-10-CM

## 2019-11-21 DIAGNOSIS — N28.9 DECREASED RENAL FUNCTION: ICD-10-CM

## 2019-11-21 DIAGNOSIS — R74.8 ELEVATED LIVER ENZYMES: Primary | ICD-10-CM

## 2019-11-21 NOTE — TELEPHONE ENCOUNTER
Pt notified of results and recommendations as per Selena. Pt verbalized understanding. Referral with OV notes and labs faxed to Dr. Sean Garcia.

## 2019-11-21 NOTE — TELEPHONE ENCOUNTER
----- Message from RASHAWN Henry sent at 11/21/2019 10:36 AM CST -----  Please let patient know that his liver enzymes are worse. Also he is now showing decrease in his renal function.   His hepatitis panel is normal, he does not show immunity to h

## 2019-11-26 ENCOUNTER — HOSPITAL ENCOUNTER (OUTPATIENT)
Dept: ULTRASOUND IMAGING | Age: 39
Discharge: HOME | End: 2019-11-26
Attending: NURSE PRACTITIONER
Payer: COMMERCIAL

## 2019-11-26 ENCOUNTER — TELEPHONE (OUTPATIENT)
Dept: FAMILY MEDICINE CLINIC | Facility: CLINIC | Age: 39
End: 2019-11-26

## 2019-11-26 DIAGNOSIS — N28.9 DECREASED RENAL FUNCTION: ICD-10-CM

## 2019-11-26 DIAGNOSIS — R74.8 ELEVATED LIVER ENZYMES: ICD-10-CM

## 2019-11-26 DIAGNOSIS — E55.9 VITAMIN D DEFICIENCY: ICD-10-CM

## 2019-11-26 PROCEDURE — 76700 US EXAM ABDOM COMPLETE: CPT | Performed by: NURSE PRACTITIONER

## 2019-11-26 NOTE — TELEPHONE ENCOUNTER
----- Message from RASHAWN Power sent at 11/26/2019 12:10 PM CST -----  Liver u/s is normal. Please let patient know and that he needs to see hepatologist. Also fax copy of u/s report to his hepatologist. Thank you.

## 2019-11-26 NOTE — TELEPHONE ENCOUNTER
Spoke with patient and he was notified of results and recommendations as per Selena. Pt verbalized understanding. Results faxed to Dr. Lauryn Gomze office.

## 2019-12-04 ENCOUNTER — TELEPHONE (OUTPATIENT)
Dept: SURGERY | Facility: CLINIC | Age: 39
End: 2019-12-04

## 2019-12-04 DIAGNOSIS — R79.89 ELEVATED LIVER FUNCTION TESTS: Primary | ICD-10-CM

## 2019-12-04 NOTE — TELEPHONE ENCOUNTER
Seen today in [facility] at offsite location. Suspect AST>ALT may be muscle related.  Possible supplement but suspect not since he's been on creatine/whey for years with prior normal liver tests    - Will repeat labs, including CK and GGT   - Check autoimmune

## 2020-04-03 ENCOUNTER — PATIENT MESSAGE (OUTPATIENT)
Dept: FAMILY MEDICINE CLINIC | Facility: CLINIC | Age: 40
End: 2020-04-03

## 2020-04-04 ENCOUNTER — PATIENT MESSAGE (OUTPATIENT)
Dept: FAMILY MEDICINE CLINIC | Facility: CLINIC | Age: 40
End: 2020-04-04

## 2020-04-06 NOTE — TELEPHONE ENCOUNTER
From: Eleuterio Rivera  To: Wiley Castillo MD  Sent: 4/3/2020 6:05 PM CDT  Subject: Non-Urgent Medical Question    OK. Thanks for the quick response.

## 2020-04-06 NOTE — TELEPHONE ENCOUNTER
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 My breaks only 20 min.  The hr lady at my work is named Blaine and the

## 2020-06-23 ENCOUNTER — TELEPHONE (OUTPATIENT)
Dept: FAMILY MEDICINE CLINIC | Facility: CLINIC | Age: 40
End: 2020-06-23

## 2020-06-23 NOTE — TELEPHONE ENCOUNTER
Patient states he is currently feeling fatigued and \"out of it\" like he used to prior to starting the prescription vitamin D. Reviewed chart and patient saw TERRANCE Barton in August 2019.  Labs ordered at that time showed vitamin D deficiency, and patient was

## 2020-06-23 NOTE — TELEPHONE ENCOUNTER
feeling fatigue again, last time he felt like this, doctor put him on Vitamin D  No future appointments. Saucerization Excision Additional Text (Leave Blank If You Do Not Want): The margin was drawn around the clinically apparent lesion.  Incisions were then made along these lines, in a tangential fashion, to the appropriate tissue plane and the lesion was extirpated.

## 2020-11-12 ENCOUNTER — OFFICE VISIT (OUTPATIENT)
Dept: FAMILY MEDICINE CLINIC | Facility: CLINIC | Age: 40
End: 2020-11-12
Payer: COMMERCIAL

## 2020-11-12 VITALS
SYSTOLIC BLOOD PRESSURE: 116 MMHG | TEMPERATURE: 98 F | OXYGEN SATURATION: 100 % | HEIGHT: 74.5 IN | RESPIRATION RATE: 16 BRPM | DIASTOLIC BLOOD PRESSURE: 72 MMHG | HEART RATE: 89 BPM | WEIGHT: 231 LBS | BODY MASS INDEX: 29.33 KG/M2

## 2020-11-12 DIAGNOSIS — Z00.00 ROUTINE HEALTH MAINTENANCE: Primary | ICD-10-CM

## 2020-11-12 PROCEDURE — 3074F SYST BP LT 130 MM HG: CPT | Performed by: NURSE PRACTITIONER

## 2020-11-12 PROCEDURE — 3008F BODY MASS INDEX DOCD: CPT | Performed by: NURSE PRACTITIONER

## 2020-11-12 PROCEDURE — 3078F DIAST BP <80 MM HG: CPT | Performed by: NURSE PRACTITIONER

## 2020-11-12 PROCEDURE — 99396 PREV VISIT EST AGE 40-64: CPT | Performed by: NURSE PRACTITIONER

## 2020-11-12 PROCEDURE — 99072 ADDL SUPL MATRL&STAF TM PHE: CPT | Performed by: NURSE PRACTITIONER

## 2020-11-12 NOTE — PATIENT INSTRUCTIONS
Routine wellness today, good exam.  Fasting labs in the next week, check with your insurance company where their preferred lab is. Flu shot updated. Routine wellness in one year.

## 2020-11-12 NOTE — PROGRESS NOTES
[facility]      HPI:   Azalia Lyn is a 41year old male who presents for an Annual Health Visit. Annual physical exam.  Had blood work drawn through work for health screening. Denies recent illness nor hospitalizations. Not on file       REVIEW OF SYSTEMS:   GENERAL HEALTH: feels well otherwise   SKIN: denies any unusual skin lesions or rashes  EYES: no visual complaints or deficits  HEENT: denies nasal congestion, sinus pain or sore throat; hearing loss negative  RESPIRA RRR; no S3, no S4; no click; murmur negative  ABDOMEN: normal active BS+, soft, nontender, nondistended; no HSM; no masses; no bruits.   GENITAL/: deferred  RECTAL: deferred  LYMPHATIC: no lymphadenopathy  MUSCULOSKELETAL: no acute synovitis upper or lowe

## 2020-11-14 ENCOUNTER — PATIENT MESSAGE (OUTPATIENT)
Dept: FAMILY MEDICINE CLINIC | Facility: CLINIC | Age: 40
End: 2020-11-14

## 2020-11-16 NOTE — TELEPHONE ENCOUNTER
From: Good Rivera  To: Eusebio De Dios MD  Sent: 11/14/2020 8:09 PM CST  Subject: Non-Urgent Medical Question    I also have been having issues with my stomach. Feel kasia naucious and not very good after eating.  I was looking up symptoms of a parasite

## 2020-11-17 NOTE — TELEPHONE ENCOUNTER
From: Enrique Rivera  To: Kai Shah MD  Sent: 11/14/2020 4:52 AM CST  Subject: Non-Urgent Medical Question    Good morning! I tried to make an appointment with you, but you're booked till next year.  I have an issue sometimes where I'll wake up and t

## 2020-11-18 ENCOUNTER — OFFICE VISIT (OUTPATIENT)
Dept: FAMILY MEDICINE CLINIC | Facility: CLINIC | Age: 40
End: 2020-11-18
Payer: COMMERCIAL

## 2020-11-18 VITALS
OXYGEN SATURATION: 94 % | RESPIRATION RATE: 16 BRPM | SYSTOLIC BLOOD PRESSURE: 114 MMHG | HEIGHT: 74.5 IN | BODY MASS INDEX: 29.08 KG/M2 | DIASTOLIC BLOOD PRESSURE: 70 MMHG | WEIGHT: 229 LBS | TEMPERATURE: 98 F | HEART RATE: 79 BPM

## 2020-11-18 DIAGNOSIS — S31.831A ANAL TEAR: ICD-10-CM

## 2020-11-18 DIAGNOSIS — K52.9 GASTROENTERITIS: Primary | ICD-10-CM

## 2020-11-18 PROCEDURE — 3078F DIAST BP <80 MM HG: CPT | Performed by: NURSE PRACTITIONER

## 2020-11-18 PROCEDURE — 99214 OFFICE O/P EST MOD 30 MIN: CPT | Performed by: NURSE PRACTITIONER

## 2020-11-18 PROCEDURE — 99072 ADDL SUPL MATRL&STAF TM PHE: CPT | Performed by: NURSE PRACTITIONER

## 2020-11-18 PROCEDURE — 3008F BODY MASS INDEX DOCD: CPT | Performed by: NURSE PRACTITIONER

## 2020-11-18 PROCEDURE — 3074F SYST BP LT 130 MM HG: CPT | Performed by: NURSE PRACTITIONER

## 2020-11-18 RX ORDER — CIPROFLOXACIN 500 MG/1
500 TABLET, FILM COATED ORAL 2 TIMES DAILY
Qty: 20 TABLET | Refills: 0 | Status: SHIPPED | OUTPATIENT
Start: 2020-11-18 | End: 2020-11-28

## 2020-11-18 RX ORDER — HYDROCORTISONE 25 MG/G
1 CREAM TOPICAL 2 TIMES DAILY
Qty: 28 G | Refills: 0 | Status: SHIPPED | OUTPATIENT
Start: 2020-11-18 | End: 2020-11-28

## 2020-11-18 RX ORDER — SACCHAROMYCES BOULARDII 250 MG
250 CAPSULE ORAL 2 TIMES DAILY
Qty: 60 CAPSULE | Refills: 0 | Status: SHIPPED | OUTPATIENT
Start: 2020-11-18 | End: 2020-12-18

## 2020-11-18 NOTE — PATIENT INSTRUCTIONS
Start Cipro 500mg twice a day for ten days; take with a little food. Take a probiotic twice a day for the next month. Notify the office by Monday if no significant improvement. Consider food allergy/sensitivity testing if no significant improvement.

## 2020-11-18 NOTE — PROGRESS NOTES
[facility] [place]  PROGRESS NOTE  Chief Complaint:   Patient presents with:  Stomach Pain  Urinary Frequency: and waking up at times in wet bed  Hemorrhoids      HPI:   This is a 41year old male coming in for bowel changes.       Patient with s Tobacco Use      Smoking status: Former Smoker        Packs/day: 2.00        Years: 14.00        Pack years: 28        Types: Cigarettes        Start date: 1997        Quit date: 2011        Years since quittin.8      Smokeless tobacco: Never Wt 229 lb (103.9 kg)   SpO2 94%   BMI 29.01 kg/m²  Estimated body mass index is 29.01 kg/m² as calculated from the following:    Height as of this encounter: 74.5\". Weight as of this encounter: 229 lb (103.9 kg).    Wt Readings from Last 6 Encounters: 250 MG Oral Cap; Take 1 capsule (250 mg total) by mouth 2 (two) times daily. Dispense: 60 capsule; Refill: 0    2.  Anal tear  May try Proctosol external cream to the site for 10 days, may also try sitz bath's to see if this helps with symptoms.  - Hydroco general medical examination at a health care facility     Vasculogenic erectile dysfunction     Acute renal insufficiency     Vitamin D deficiency      RASHAWN Lizarraga  11/18/2020  2:28 PM    This note was created utilizing Dragon speech recognition s

## 2020-11-19 ENCOUNTER — TELEPHONE (OUTPATIENT)
Dept: FAMILY MEDICINE CLINIC | Facility: CLINIC | Age: 40
End: 2020-11-19

## 2020-11-19 ENCOUNTER — LABORATORY ENCOUNTER (OUTPATIENT)
Dept: LAB | Age: 40
End: 2020-11-19
Attending: NURSE PRACTITIONER
Payer: COMMERCIAL

## 2020-11-19 DIAGNOSIS — K52.9 GASTROENTERITIS: Primary | ICD-10-CM

## 2020-11-19 DIAGNOSIS — Z00.00 ROUTINE HEALTH MAINTENANCE: ICD-10-CM

## 2020-11-19 DIAGNOSIS — Z91.018 FOOD ALLERGY: ICD-10-CM

## 2020-11-19 PROCEDURE — 80050 GENERAL HEALTH PANEL: CPT | Performed by: NURSE PRACTITIONER

## 2020-11-19 PROCEDURE — 86003 ALLG SPEC IGE CRUDE XTRC EA: CPT | Performed by: NURSE PRACTITIONER

## 2020-11-19 PROCEDURE — 36415 COLL VENOUS BLD VENIPUNCTURE: CPT | Performed by: NURSE PRACTITIONER

## 2020-11-19 PROCEDURE — 82785 ASSAY OF IGE: CPT | Performed by: NURSE PRACTITIONER

## 2020-11-19 PROCEDURE — 84439 ASSAY OF FREE THYROXINE: CPT | Performed by: NURSE PRACTITIONER

## 2020-11-19 PROCEDURE — 80061 LIPID PANEL: CPT | Performed by: NURSE PRACTITIONER

## 2020-11-19 NOTE — TELEPHONE ENCOUNTER
Patient states he is coming in this morning for labs. Would like to add food allergy testing as well. States he spoke with insurance and it will be covered 100% as long as it is coded as  \"allergy test.\"  Please advise order.

## 2020-11-19 NOTE — TELEPHONE ENCOUNTER
has BW appt today   and has coding questions on allergy testing       Future Appointments   Date Time Provider Tae Lock   11/19/2020 11:00 AM REF [place] [facility]

## 2021-02-04 ENCOUNTER — TELEPHONE (OUTPATIENT)
Dept: FAMILY MEDICINE CLINIC | Facility: CLINIC | Age: 41
End: 2021-02-04

## 2021-02-04 ENCOUNTER — OFFICE VISIT (OUTPATIENT)
Dept: FAMILY MEDICINE CLINIC | Facility: CLINIC | Age: 41
End: 2021-02-04
Payer: COMMERCIAL

## 2021-02-04 VITALS
HEART RATE: 106 BPM | BODY MASS INDEX: 29.71 KG/M2 | TEMPERATURE: 99 F | WEIGHT: 234 LBS | SYSTOLIC BLOOD PRESSURE: 120 MMHG | HEIGHT: 74.5 IN | OXYGEN SATURATION: 99 % | DIASTOLIC BLOOD PRESSURE: 64 MMHG | RESPIRATION RATE: 16 BRPM

## 2021-02-04 DIAGNOSIS — M79.89 PAIN AND SWELLING OF RIGHT LOWER EXTREMITY: Primary | ICD-10-CM

## 2021-02-04 DIAGNOSIS — M79.604 PAIN AND SWELLING OF RIGHT LOWER EXTREMITY: Primary | ICD-10-CM

## 2021-02-04 PROCEDURE — 3078F DIAST BP <80 MM HG: CPT | Performed by: NURSE PRACTITIONER

## 2021-02-04 PROCEDURE — 99214 OFFICE O/P EST MOD 30 MIN: CPT | Performed by: NURSE PRACTITIONER

## 2021-02-04 PROCEDURE — 3074F SYST BP LT 130 MM HG: CPT | Performed by: NURSE PRACTITIONER

## 2021-02-04 PROCEDURE — 3008F BODY MASS INDEX DOCD: CPT | Performed by: NURSE PRACTITIONER

## 2021-02-04 RX ORDER — IBUPROFEN 800 MG/1
800 TABLET ORAL 3 TIMES DAILY
Qty: 30 TABLET | Refills: 0 | Status: SHIPPED | OUTPATIENT
Start: 2021-02-04 | End: 2021-02-14

## 2021-02-04 NOTE — TELEPHONE ENCOUNTER
pt is feeling some pain in right leg- not sure if he should be concerned about this - wants to talk to nurse

## 2021-02-04 NOTE — PATIENT INSTRUCTIONS
STAT ultrasound of right lower extremity to rule out a DVT. They will keep you at facility and call me with results. If it is positive for a DVT, then I will have you proceed to the ER additional management.

## 2021-02-04 NOTE — PROGRESS NOTES
[facility] [place]  PROGRESS NOTE  Chief Complaint:   Patient presents with:  Leg Pain: swelling/pain right leg      HPI:   This is a 41year old male coming in for right leg pain, swelling, and redness    Patient has had swelling, redness, and gain/loss, fever, chills, or fatigue. EENT:  Eyes:  Denies eye pain, visual loss, blurred vision, double vision or yellow sclerae. INTEGUMENTARY: Redness at right lower inner extremity.   CARDIOVASCULAR:  Denies chest pain, chest pressure, chest discomfo clubbing, Full ROM, 2+ dorsalis pedis pulses bilaterally. Mild edema to the right lower extremity with erythema medially extending along the patient's varicose veins. NEURO:  No deficit, normal gait, strength and tone, sensory intact, normal reflexes. If it is positive for a DVT, then I will have you proceed to the ER additional management. Health Maintenance: There are no preventive care reminders to display for this patient.       Problem List:  Patient Active Problem List:     Viral warts

## 2021-02-04 NOTE — TELEPHONE ENCOUNTER
Right leg pain for 4 days. States it is along the inside of his leg. States there is some redness. Denies the area is warm. States he lifts weights and thinks he pulled \"something\".   Patient advised to come over right now as the office is closing at 4pm

## 2022-01-24 ENCOUNTER — PATIENT MESSAGE (OUTPATIENT)
Dept: FAMILY MEDICINE CLINIC | Facility: CLINIC | Age: 42
End: 2022-01-24

## 2022-01-24 NOTE — TELEPHONE ENCOUNTER
From: Sonia Rivera  To: Krystyna Pond MD  Sent: 1/24/2022 8:08 AM CST  Subject: Vericose veins     Good morning! Hope all. Is well.  I remember us discussing my vericose veins before and you suggested I go to vein Dr. Terrell Castaneda are getting worse and now I'm

## 2022-02-01 ENCOUNTER — TELEPHONE (OUTPATIENT)
Dept: FAMILY MEDICINE CLINIC | Facility: CLINIC | Age: 42
End: 2022-02-01

## 2022-02-01 NOTE — TELEPHONE ENCOUNTER
Pt states that he ordered a supplement through [facility] called [employer]. He reports taking 5 days of pills BID for 10 total pills. Last time he took the pill was on Saturday, 1/29/22. He reports a itchy rash that is currently on his neck. He states it has been all over his body. Rash is not raised. Pt states he has had low testosterone levels in the past. He lifts weights and was hoping to increase his testosterone levels with this supplement. Advised pt to not take any more of the [employer]. Further explained to patient that some OTC supplements are not regulated by the FDA and might not be safe to take. Explained to pt that if he has low testosterone levels, a new lab to show his current level would need to be taken and there can be therapies prescribed/managed by a physician that would be a safer alternative. Pt does have a Testosterone level from 8/30/17 with a level of 292.3 (241.0-827.0 reference range. Pt would like to know what to take for the itchiness. If an Rx is called in, he asked for it to go to the [facility] in [place]. Please advise.

## 2022-02-01 NOTE — TELEPHONE ENCOUNTER
Agree with recommendations     Recommend benadryl - 25 mg twice daily to help with itching. For 2 - 7 days.

## 2022-02-01 NOTE — TELEPHONE ENCOUNTER
started a new medication on 01-25 broke out in a rash on friday 01-28 that comes and goes, and when it comes back its in a different spot

## 2022-04-06 ENCOUNTER — OFFICE VISIT (OUTPATIENT)
Dept: FAMILY MEDICINE CLINIC | Facility: CLINIC | Age: 42
End: 2022-04-06
Payer: COMMERCIAL

## 2022-04-06 ENCOUNTER — TELEPHONE (OUTPATIENT)
Dept: FAMILY MEDICINE CLINIC | Facility: CLINIC | Age: 42
End: 2022-04-06

## 2022-04-06 VITALS
SYSTOLIC BLOOD PRESSURE: 116 MMHG | WEIGHT: 228.63 LBS | HEIGHT: 74.5 IN | HEART RATE: 66 BPM | BODY MASS INDEX: 29.03 KG/M2 | RESPIRATION RATE: 18 BRPM | OXYGEN SATURATION: 97 % | TEMPERATURE: 98 F | DIASTOLIC BLOOD PRESSURE: 80 MMHG

## 2022-04-06 DIAGNOSIS — N52.9 VASCULOGENIC ERECTILE DYSFUNCTION, UNSPECIFIED VASCULOGENIC ERECTILE DYSFUNCTION TYPE: ICD-10-CM

## 2022-04-06 DIAGNOSIS — R53.82 CHRONIC FATIGUE: ICD-10-CM

## 2022-04-06 DIAGNOSIS — E55.9 VITAMIN D DEFICIENCY: ICD-10-CM

## 2022-04-06 DIAGNOSIS — Z00.00 ROUTINE HEALTH MAINTENANCE: Primary | ICD-10-CM

## 2022-04-06 PROCEDURE — 3074F SYST BP LT 130 MM HG: CPT | Performed by: FAMILY MEDICINE

## 2022-04-06 PROCEDURE — 3079F DIAST BP 80-89 MM HG: CPT | Performed by: FAMILY MEDICINE

## 2022-04-06 PROCEDURE — 99396 PREV VISIT EST AGE 40-64: CPT | Performed by: FAMILY MEDICINE

## 2022-04-06 PROCEDURE — 3008F BODY MASS INDEX DOCD: CPT | Performed by: FAMILY MEDICINE

## 2022-04-06 RX ORDER — NAPROXEN 500 MG/1
500 TABLET ORAL 2 TIMES DAILY WITH MEALS
Qty: 40 TABLET | Refills: 1 | Status: SHIPPED | OUTPATIENT
Start: 2022-04-06

## 2022-04-06 NOTE — PATIENT INSTRUCTIONS
Good exam       Labs in near future. - early morning   Check for Biotin in any supplements used - hold for 3 days. Naprosyn for leg irritation       Recheck in 1 year.

## 2022-04-06 NOTE — TELEPHONE ENCOUNTER
Patient just had appt and states script was supposed to be sent to [facility] in [place] but pharmacy has not receieved any.

## 2022-04-06 NOTE — TELEPHONE ENCOUNTER
Pt states that he was supposed to have a prescription of Naprosyn sent in to the pharmacy. Pt states that Dr. Madhavi Hart informed him that prior to his lab work he is to be off of his supplements x 3 days. Pt asking if he should be off of his Creatine supplement and his protein powder. Pt uses protein powder for weight training. Inquired if pt's protein powder has any supplements in it. Pt reports he is unsure. Advised pt to hold off on any supplements for 3 days until his lab work is completed. Pt states that he would like Dr. Manjula Vazquez. Douglas's opinion. Please advise.

## 2022-04-06 NOTE — TELEPHONE ENCOUNTER
Naprosyn prescription sent in , in order to help leg. #2 - Creatine supplement is a mixture of amino acids making up proteins which may help the muscles. Biotin is a vitamin made up of protein - which frequently is in a protein supplement. This should be stated on the packaging but because it is not considered it is not required. Therefore, recommend stopping all supplements 3 days prior to blood testing to help improve accuracy.

## 2022-04-07 PROBLEM — R53.82 CHRONIC FATIGUE: Status: ACTIVE | Noted: 2022-04-07

## 2022-04-07 PROBLEM — N28.9 ACUTE RENAL INSUFFICIENCY: Status: RESOLVED | Noted: 2017-07-14 | Resolved: 2022-04-07

## 2022-04-14 ENCOUNTER — LABORATORY ENCOUNTER (OUTPATIENT)
Dept: LAB | Age: 42
End: 2022-04-14
Attending: FAMILY MEDICINE
Payer: COMMERCIAL

## 2022-04-14 DIAGNOSIS — Z00.00 ROUTINE HEALTH MAINTENANCE: ICD-10-CM

## 2022-04-14 DIAGNOSIS — E55.9 VITAMIN D DEFICIENCY: ICD-10-CM

## 2022-04-14 DIAGNOSIS — N52.9 VASCULOGENIC ERECTILE DYSFUNCTION, UNSPECIFIED VASCULOGENIC ERECTILE DYSFUNCTION TYPE: ICD-10-CM

## 2022-04-14 LAB
ALBUMIN SERPL-MCNC: 4.1 G/DL (ref 3.4–5)
ALBUMIN/GLOB SERPL: 1.3 {RATIO} (ref 1–2)
ALP LIVER SERPL-CCNC: 52 U/L
ALT SERPL-CCNC: 26 U/L
ANION GAP SERPL CALC-SCNC: 5 MMOL/L (ref 0–18)
AST SERPL-CCNC: 16 U/L (ref 15–37)
BASOPHILS # BLD AUTO: 0.05 X10(3) UL (ref 0–0.2)
BASOPHILS NFR BLD AUTO: 0.8 %
BILIRUB SERPL-MCNC: 0.8 MG/DL (ref 0.1–2)
BILIRUB UR QL STRIP.AUTO: NEGATIVE
BUN BLD-MCNC: 18 MG/DL (ref 7–18)
CALCIUM BLD-MCNC: 9.7 MG/DL (ref 8.5–10.1)
CHLORIDE SERPL-SCNC: 107 MMOL/L (ref 98–112)
CHOLEST SERPL-MCNC: 128 MG/DL (ref ?–200)
CLARITY UR REFRACT.AUTO: CLEAR
CO2 SERPL-SCNC: 30 MMOL/L (ref 21–32)
COLOR UR AUTO: YELLOW
CORTIS SERPL-MCNC: 20 UG/DL
CREAT BLD-MCNC: 1.3 MG/DL
EOSINOPHIL # BLD AUTO: 0.54 X10(3) UL (ref 0–0.7)
EOSINOPHIL NFR BLD AUTO: 9.1 %
ERYTHROCYTE [DISTWIDTH] IN BLOOD BY AUTOMATED COUNT: 12.1 %
FASTING PATIENT LIPID ANSWER: YES
FASTING STATUS PATIENT QL REPORTED: YES
GLOBULIN PLAS-MCNC: 3.1 G/DL (ref 2.8–4.4)
GLUCOSE BLD-MCNC: 95 MG/DL (ref 70–99)
GLUCOSE UR STRIP.AUTO-MCNC: NEGATIVE MG/DL
HCT VFR BLD AUTO: 47.2 %
HDLC SERPL-MCNC: 32 MG/DL (ref 40–59)
HGB BLD-MCNC: 15.3 G/DL
IMM GRANULOCYTES # BLD AUTO: 0.01 X10(3) UL (ref 0–1)
IMM GRANULOCYTES NFR BLD: 0.2 %
KETONES UR STRIP.AUTO-MCNC: NEGATIVE MG/DL
LDLC SERPL CALC-MCNC: 82 MG/DL (ref ?–100)
LEUKOCYTE ESTERASE UR QL STRIP.AUTO: NEGATIVE
LYMPHOCYTES # BLD AUTO: 1.49 X10(3) UL (ref 1–4)
LYMPHOCYTES NFR BLD AUTO: 25.2 %
MCH RBC QN AUTO: 31.1 PG (ref 26–34)
MCHC RBC AUTO-ENTMCNC: 32.4 G/DL (ref 31–37)
MCV RBC AUTO: 95.9 FL
MONOCYTES # BLD AUTO: 0.57 X10(3) UL (ref 0.1–1)
MONOCYTES NFR BLD AUTO: 9.6 %
NEUTROPHILS # BLD AUTO: 3.26 X10 (3) UL (ref 1.5–7.7)
NEUTROPHILS # BLD AUTO: 3.26 X10(3) UL (ref 1.5–7.7)
NEUTROPHILS NFR BLD AUTO: 55.1 %
NITRITE UR QL STRIP.AUTO: NEGATIVE
NONHDLC SERPL-MCNC: 96 MG/DL (ref ?–130)
OSMOLALITY SERPL CALC.SUM OF ELEC: 296 MOSM/KG (ref 275–295)
PH UR STRIP.AUTO: 7 [PH] (ref 5–8)
PLATELET # BLD AUTO: 183 10(3)UL (ref 150–450)
POTASSIUM SERPL-SCNC: 4.1 MMOL/L (ref 3.5–5.1)
PROT SERPL-MCNC: 7.2 G/DL (ref 6.4–8.2)
PROT UR STRIP.AUTO-MCNC: NEGATIVE MG/DL
RBC # BLD AUTO: 4.92 X10(6)UL
RBC UR QL AUTO: NEGATIVE
SODIUM SERPL-SCNC: 142 MMOL/L (ref 136–145)
SP GR UR STRIP.AUTO: 1.01 (ref 1–1.03)
TRIGL SERPL-MCNC: 70 MG/DL (ref 30–149)
TSI SER-ACNC: 0.77 MIU/ML (ref 0.36–3.74)
UROBILINOGEN UR STRIP.AUTO-MCNC: <2 MG/DL
VIT D+METAB SERPL-MCNC: 50.9 NG/ML (ref 30–100)
VLDLC SERPL CALC-MCNC: 11 MG/DL (ref 0–30)
WBC # BLD AUTO: 5.9 X10(3) UL (ref 4–11)

## 2022-04-14 PROCEDURE — 80061 LIPID PANEL: CPT

## 2022-04-14 PROCEDURE — 80053 COMPREHEN METABOLIC PANEL: CPT

## 2022-04-14 PROCEDURE — 81003 URINALYSIS AUTO W/O SCOPE: CPT

## 2022-04-14 PROCEDURE — 84443 ASSAY THYROID STIM HORMONE: CPT

## 2022-04-14 PROCEDURE — 82306 VITAMIN D 25 HYDROXY: CPT

## 2022-04-14 PROCEDURE — 84402 ASSAY OF FREE TESTOSTERONE: CPT

## 2022-04-14 PROCEDURE — 82533 TOTAL CORTISOL: CPT

## 2022-04-14 PROCEDURE — 85025 COMPLETE CBC W/AUTO DIFF WBC: CPT

## 2022-04-14 PROCEDURE — 36415 COLL VENOUS BLD VENIPUNCTURE: CPT

## 2022-04-14 PROCEDURE — 84403 ASSAY OF TOTAL TESTOSTERONE: CPT

## 2022-04-15 ENCOUNTER — PATIENT MESSAGE (OUTPATIENT)
Dept: FAMILY MEDICINE CLINIC | Facility: CLINIC | Age: 42
End: 2022-04-15

## 2022-04-15 NOTE — TELEPHONE ENCOUNTER
From: Bertha Rivera  Sent: 4/15/2022 10:35 AM CDT  To: [facility] Clinical Staff      There's something that happened after my phisical. I was eating rice and it got stuck in throat. I wasn't able to push it down with water. Was able to get it down with a big gulp. Is that acid reflux. What does Aracelis Fry suggest I can take over the counter for that?  Sometimes it feels like something stuck in there

## 2022-04-15 NOTE — TELEPHONE ENCOUNTER
From: Trevor Rivera  To: Nhi Hernandez MD  Sent: 4/15/2022 10:23 AM CDT  Subject: Question     I went for my blood work Thursday. I got some results, but I didn't see the total and free testosterone results.

## 2022-04-15 NOTE — TELEPHONE ENCOUNTER
From: Kathrin Rivera  Sent: 4/15/2022 10:30 AM CDT  To: [facility] Clinical Staff      OK thank you.

## 2022-04-20 NOTE — TELEPHONE ENCOUNTER
Pt reports that he ate some \"sticky\" rice quickly and found that it was hard to get down. He states he eats 6 meals a day so he can get enough calories in for his weight building. Pt reports he cannot afford to come in for another doctor's appointment - that money is tight. Advised pt that he should slow his eating down to see if this helps. Pt states that he does often rush meals due to eating so many of them. Also advised to drink drink water while eating his meals. Pt inquiring if he should be started on an acid reflux pill. Please advise.

## 2022-04-22 ENCOUNTER — PATIENT MESSAGE (OUTPATIENT)
Dept: FAMILY MEDICINE CLINIC | Facility: CLINIC | Age: 42
End: 2022-04-22

## 2022-04-22 LAB
TESTOSTERONE TOTAL: 516 NG/DL
TESTOSTERONE, FREE -MS/MS: 84.4 PG/ML

## 2022-04-25 NOTE — TELEPHONE ENCOUNTER
From: Tenisha Primer  To: Mary Rivera  Sent: 4/22/2022 2:36 PM CDT    Brain,    Would you be able to get a copy of the report regarding your blood clot from the [address] [place] [address] for Dr. Gerson Caballero to review? If needed, the office fax number is [phone]. Per Dr. Mirtah Rolle, please continue to take Aspirin 325 mg by mouth daily for two months. You can apply a warm compress (do not massage) to the affected area. Elevation may also help with the swelling. Please let us know if you have any further questions.     Thank you,    [facility]  [phone]

## 2022-04-25 NOTE — TELEPHONE ENCOUNTER
Patient has a widening streak of red running up his thigh and knee. Vein clinc had advised him to wear compression stockings on Friday. Advised patient to stop wearing the stockings for now and check in with vein clinic. Patient is also to have clinic fax over report from his visit on Friday. Please advise any other recommendations.

## 2022-04-25 NOTE — TELEPHONE ENCOUNTER
Patient reports he had a fever of 101 yesterday. Patient was advised by vein clinic to schedule with PCP regarding possible need for anticoagulation if redness continues to expand. Patient was advised to continue wearing the compression stockings. Vein clinic is faxing over the report at this time. Please advise regarding follow up appointment here.

## 2022-05-28 ENCOUNTER — PATIENT MESSAGE (OUTPATIENT)
Dept: FAMILY MEDICINE CLINIC | Facility: CLINIC | Age: 42
End: 2022-05-28

## 2022-05-31 ENCOUNTER — PATIENT MESSAGE (OUTPATIENT)
Dept: FAMILY MEDICINE CLINIC | Facility: CLINIC | Age: 42
End: 2022-05-31

## 2022-05-31 NOTE — TELEPHONE ENCOUNTER
From: Demond Rivera  To: RASHAWN Horne  Sent: 5/31/2022 12:14 PM CDT  Subject: Question     I sent this message to Analilia Garza ,but don't think he got it. Could you please forward this to him for me. Thanks! Back in the past when I told you my mood was not so good cause of work etc and my ocd. You put me on paroxetine. Would you still recommend that, my ocd seems to be getting worse for some reason. I can't get the prescription anymore though. Also the erectile dysfunction medicine you had me on before I can't get anymore either. Could I get a new prescription to that as well? Thanks!

## 2022-05-31 NOTE — TELEPHONE ENCOUNTER
Pt has a history of being on Sildenafil Citrate 50 mg. Please advise on Paroxetine. Future appt:    Last Appointment with provider:   4/6/2022 for Annual Physical Exam with TR and recheck in one year. Last appointment at [facility]:  4/6/2022  Cholesterol, Total (mg/dL)   Date Value   04/14/2022 128     HDL Cholesterol (mg/dL)   Date Value   04/14/2022 32 (L)     LDL Cholesterol (mg/dL)   Date Value   04/14/2022 82     Triglycerides (mg/dL)   Date Value   04/14/2022 70     No results found for: EAG, A1C  Lab Results   Component Value Date    T4F 1.0 11/19/2020    TSH 0.768 04/14/2022       No follow-ups on file.

## 2022-06-21 ENCOUNTER — PATIENT MESSAGE (OUTPATIENT)
Dept: FAMILY MEDICINE CLINIC | Facility: CLINIC | Age: 42
End: 2022-06-21

## 2022-06-21 RX ORDER — PAROXETINE 10 MG/1
10 TABLET, FILM COATED ORAL EVERY MORNING
Qty: 90 TABLET | Refills: 3 | Status: SHIPPED | OUTPATIENT
Start: 2022-06-21

## 2022-06-21 RX ORDER — SILDENAFIL 50 MG/1
50 TABLET, FILM COATED ORAL
Qty: 8 TABLET | Refills: 3 | Status: SHIPPED | OUTPATIENT
Start: 2022-06-21 | End: 2022-06-21

## 2022-06-21 RX ORDER — SILDENAFIL 50 MG/1
50 TABLET, FILM COATED ORAL
Qty: 24 TABLET | Refills: 3 | Status: SHIPPED | OUTPATIENT
Start: 2022-06-21

## 2022-06-21 NOTE — TELEPHONE ENCOUNTER
From: Dillon Rivera  To: RASHAWN Bonilla  Sent: 6/21/2022 8:53 AM CDT  Subject: Refill request    You had started me on Sildenafil citrate (viagra) in November when I seen you. I tried to refill the prescription but I guess it's past the refill date. I can't afford to make an appointment just to get a refill again. Is there anyway I can get this refilled at the [facility] in [facility]? Thanks and hope you have a good day.

## 2022-06-21 NOTE — TELEPHONE ENCOUNTER
Original prescription was given by Esau Charles at patient's physical with her on 11/13/2019. Patient has not seen Joneen Klinefelter since. Last OV was for a physical on 4/6/2022 with Dr. Antonia Shi, PCP. Please advise.

## 2022-07-26 ENCOUNTER — TELEPHONE (OUTPATIENT)
Dept: FAMILY MEDICINE CLINIC | Facility: CLINIC | Age: 42
End: 2022-07-26

## 2022-07-26 NOTE — TELEPHONE ENCOUNTER
At the time we did food allergy testing as he was having a lot of GI symptoms with recommendations to remove it from diet to see if it helped his symptoms. There is still the potential the patient will respond similarly to duck eggs. Would likely still continue to test positive.

## 2022-07-26 NOTE — TELEPHONE ENCOUNTER
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 Would like a call back.

## 2022-07-26 NOTE — TELEPHONE ENCOUNTER
Discussed with patient lab result message for Adult Food Allergy from RASHAWN Mazariegos from 11/19/2020. Pt asking if he can substitute one type of an egg for another and not have a reaction to it. Advised patient against using different types of eggs. Pt asking if he can be retested again to see if the \"allergy went away. \" He states he has removed all eggs from his diet. Explained to patient that he will most likely continue to test positive for the egg allergy. Please advise.

## 2022-09-21 ENCOUNTER — LAB ENCOUNTER (OUTPATIENT)
Dept: LAB | Age: 42
End: 2022-09-21
Attending: FAMILY MEDICINE

## 2022-09-21 ENCOUNTER — OFFICE VISIT (OUTPATIENT)
Dept: FAMILY MEDICINE CLINIC | Facility: CLINIC | Age: 42
End: 2022-09-21

## 2022-09-21 VITALS
SYSTOLIC BLOOD PRESSURE: 96 MMHG | DIASTOLIC BLOOD PRESSURE: 70 MMHG | HEART RATE: 61 BPM | OXYGEN SATURATION: 97 % | TEMPERATURE: 98 F | RESPIRATION RATE: 14 BRPM

## 2022-09-21 DIAGNOSIS — Z11.3 SCREENING EXAMINATION FOR STD (SEXUALLY TRANSMITTED DISEASE): ICD-10-CM

## 2022-09-21 DIAGNOSIS — Z11.3 SCREENING EXAMINATION FOR STD (SEXUALLY TRANSMITTED DISEASE): Primary | ICD-10-CM

## 2022-09-21 LAB
HBV SURFACE AB SER QL: NONREACTIVE
HBV SURFACE AB SERPL IA-ACNC: <3.1 MIU/ML
HBV SURFACE AG SER-ACNC: <0.1 [IU]/L
HBV SURFACE AG SERPL QL IA: NONREACTIVE
HCV AB SERPL QL IA: NONREACTIVE
T PALLIDUM AB SER QL IA: NONREACTIVE

## 2022-09-21 PROCEDURE — 86780 TREPONEMA PALLIDUM: CPT

## 2022-09-21 PROCEDURE — 87491 CHLMYD TRACH DNA AMP PROBE: CPT

## 2022-09-21 PROCEDURE — 86706 HEP B SURFACE ANTIBODY: CPT

## 2022-09-21 PROCEDURE — 3074F SYST BP LT 130 MM HG: CPT

## 2022-09-21 PROCEDURE — 87389 HIV-1 AG W/HIV-1&-2 AB AG IA: CPT

## 2022-09-21 PROCEDURE — 86695 HERPES SIMPLEX TYPE 1 TEST: CPT

## 2022-09-21 PROCEDURE — 86803 HEPATITIS C AB TEST: CPT

## 2022-09-21 PROCEDURE — 99213 OFFICE O/P EST LOW 20 MIN: CPT

## 2022-09-21 PROCEDURE — 3078F DIAST BP <80 MM HG: CPT

## 2022-09-21 PROCEDURE — 87340 HEPATITIS B SURFACE AG IA: CPT

## 2022-09-21 PROCEDURE — 86696 HERPES SIMPLEX TYPE 2 TEST: CPT

## 2022-09-21 PROCEDURE — 87591 N.GONORRHOEAE DNA AMP PROB: CPT

## 2022-09-21 RX ORDER — SILDENAFIL 50 MG/1
50 TABLET, FILM COATED ORAL
Qty: 24 TABLET | Refills: 3 | Status: SHIPPED | OUTPATIENT
Start: 2022-09-21

## 2022-09-22 ENCOUNTER — TELEPHONE (OUTPATIENT)
Dept: FAMILY MEDICINE CLINIC | Facility: CLINIC | Age: 42
End: 2022-09-22

## 2022-09-22 LAB
C TRACH DNA SPEC QL NAA+PROBE: NEGATIVE
N GONORRHOEA DNA SPEC QL NAA+PROBE: NEGATIVE

## 2022-09-22 NOTE — TELEPHONE ENCOUNTER
----- Message from RASHAWN Jose sent at 9/22/2022  4:07 PM CDT -----  Result negative. All labs negative.

## 2022-09-23 ENCOUNTER — TELEPHONE (OUTPATIENT)
Dept: FAMILY MEDICINE CLINIC | Facility: CLINIC | Age: 42
End: 2022-09-23

## 2022-09-23 LAB
HSV 1 GLYCOPROTEIN G, IGG: NEGATIVE
HSV 2 GLYCOPROTEIN G, IGG: NEGATIVE

## 2022-10-07 ENCOUNTER — TELEPHONE (OUTPATIENT)
Dept: FAMILY MEDICINE CLINIC | Facility: CLINIC | Age: 42
End: 2022-10-07

## 2022-10-07 NOTE — TELEPHONE ENCOUNTER
Called pt and informed him Dr. João Randhawa is not accepting any NP at this time. Pt verbalized understanding and thanked us for checking.

## 2022-10-11 ENCOUNTER — TELEPHONE (OUTPATIENT)
Dept: FAMILY MEDICINE CLINIC | Facility: CLINIC | Age: 42
End: 2022-10-11

## 2022-10-12 RX ORDER — MULTIVIT WITH MINERALS/LUTEIN
250 TABLET ORAL DAILY
COMMUNITY

## 2022-10-12 RX ORDER — CHLORAL HYDRATE 500 MG
1000 CAPSULE ORAL DAILY
COMMUNITY

## 2022-10-12 RX ORDER — MULTIVITAMIN
1 TABLET ORAL DAILY
COMMUNITY

## 2022-10-12 RX ORDER — UBIDECARENONE 75 MG
250 CAPSULE ORAL DAILY
COMMUNITY

## 2022-10-12 NOTE — TELEPHONE ENCOUNTER
Spoke to pt advised of recommendations below. Pt stated he already takes vitamin b complex and b-12 daily. Pt also stated he takes vitamin c, d-3, fish oil, and mvi. Advised pt those are not on his medication list.  Updated medication list.     Any additional recommendations.

## 2023-01-20 ENCOUNTER — PATIENT MESSAGE (OUTPATIENT)
Dept: FAMILY MEDICINE CLINIC | Facility: CLINIC | Age: 43
End: 2023-01-20

## 2023-01-20 ENCOUNTER — TELEPHONE (OUTPATIENT)
Dept: FAMILY MEDICINE CLINIC | Facility: CLINIC | Age: 43
End: 2023-01-20

## 2023-01-20 ENCOUNTER — OFFICE VISIT (OUTPATIENT)
Dept: FAMILY MEDICINE CLINIC | Facility: CLINIC | Age: 43
End: 2023-01-20
Payer: COMMERCIAL

## 2023-01-20 ENCOUNTER — LAB ENCOUNTER (OUTPATIENT)
Dept: LAB | Age: 43
End: 2023-01-20
Payer: COMMERCIAL

## 2023-01-20 VITALS
TEMPERATURE: 98 F | BODY MASS INDEX: 27.22 KG/M2 | HEART RATE: 63 BPM | HEIGHT: 74.5 IN | DIASTOLIC BLOOD PRESSURE: 82 MMHG | WEIGHT: 214.38 LBS | SYSTOLIC BLOOD PRESSURE: 104 MMHG | OXYGEN SATURATION: 97 % | RESPIRATION RATE: 18 BRPM

## 2023-01-20 DIAGNOSIS — R10.9 STOMACH DISCOMFORT: ICD-10-CM

## 2023-01-20 DIAGNOSIS — R10.13 EPIGASTRIC ABDOMINAL PAIN: ICD-10-CM

## 2023-01-20 DIAGNOSIS — R10.9 STOMACH DISCOMFORT: Primary | ICD-10-CM

## 2023-01-20 LAB
ALBUMIN SERPL-MCNC: 4 G/DL (ref 3.4–5)
ALBUMIN/GLOB SERPL: 1.1 {RATIO} (ref 1–2)
ALP LIVER SERPL-CCNC: 54 U/L
ALT SERPL-CCNC: 30 U/L
ANION GAP SERPL CALC-SCNC: 4 MMOL/L (ref 0–18)
AST SERPL-CCNC: 18 U/L (ref 15–37)
BASOPHILS # BLD AUTO: 0.04 X10(3) UL (ref 0–0.2)
BASOPHILS NFR BLD AUTO: 0.6 %
BILIRUB SERPL-MCNC: 0.8 MG/DL (ref 0.1–2)
BUN BLD-MCNC: 33 MG/DL (ref 7–18)
CALCIUM BLD-MCNC: 9.6 MG/DL (ref 8.5–10.1)
CHLORIDE SERPL-SCNC: 104 MMOL/L (ref 98–112)
CO2 SERPL-SCNC: 31 MMOL/L (ref 21–32)
CREAT BLD-MCNC: 1.42 MG/DL
EOSINOPHIL # BLD AUTO: 0.44 X10(3) UL (ref 0–0.7)
EOSINOPHIL NFR BLD AUTO: 6.2 %
ERYTHROCYTE [DISTWIDTH] IN BLOOD BY AUTOMATED COUNT: 12.5 %
FASTING STATUS PATIENT QL REPORTED: NO
GFR SERPLBLD BASED ON 1.73 SQ M-ARVRAT: 62 ML/MIN/1.73M2 (ref 60–?)
GLOBULIN PLAS-MCNC: 3.6 G/DL (ref 2.8–4.4)
GLUCOSE BLD-MCNC: 97 MG/DL (ref 70–99)
HCT VFR BLD AUTO: 44.3 %
HGB BLD-MCNC: 15.5 G/DL
IMM GRANULOCYTES # BLD AUTO: 0.02 X10(3) UL (ref 0–1)
IMM GRANULOCYTES NFR BLD: 0.3 %
LYMPHOCYTES # BLD AUTO: 1.96 X10(3) UL (ref 1–4)
LYMPHOCYTES NFR BLD AUTO: 27.8 %
MCH RBC QN AUTO: 32.6 PG (ref 26–34)
MCHC RBC AUTO-ENTMCNC: 35 G/DL (ref 31–37)
MCV RBC AUTO: 93.1 FL
MONOCYTES # BLD AUTO: 0.57 X10(3) UL (ref 0.1–1)
MONOCYTES NFR BLD AUTO: 8.1 %
NEUTROPHILS # BLD AUTO: 4.02 X10 (3) UL (ref 1.5–7.7)
NEUTROPHILS # BLD AUTO: 4.02 X10(3) UL (ref 1.5–7.7)
NEUTROPHILS NFR BLD AUTO: 57 %
OSMOLALITY SERPL CALC.SUM OF ELEC: 295 MOSM/KG (ref 275–295)
PLATELET # BLD AUTO: 170 10(3)UL (ref 150–450)
POTASSIUM SERPL-SCNC: 4.2 MMOL/L (ref 3.5–5.1)
PROT SERPL-MCNC: 7.6 G/DL (ref 6.4–8.2)
RBC # BLD AUTO: 4.76 X10(6)UL
SODIUM SERPL-SCNC: 139 MMOL/L (ref 136–145)
T4 FREE SERPL-MCNC: 0.8 NG/DL (ref 0.8–1.7)
TSI SER-ACNC: 0.85 MIU/ML (ref 0.36–3.74)
WBC # BLD AUTO: 7.1 X10(3) UL (ref 4–11)

## 2023-01-20 PROCEDURE — 84439 ASSAY OF FREE THYROXINE: CPT

## 2023-01-20 PROCEDURE — 99214 OFFICE O/P EST MOD 30 MIN: CPT

## 2023-01-20 PROCEDURE — 3079F DIAST BP 80-89 MM HG: CPT

## 2023-01-20 PROCEDURE — 80050 GENERAL HEALTH PANEL: CPT

## 2023-01-20 PROCEDURE — 3074F SYST BP LT 130 MM HG: CPT

## 2023-01-20 PROCEDURE — 83013 H PYLORI (C-13) BREATH: CPT

## 2023-01-20 PROCEDURE — 3008F BODY MASS INDEX DOCD: CPT

## 2023-01-20 RX ORDER — FAMOTIDINE 20 MG/1
20 TABLET, FILM COATED ORAL 2 TIMES DAILY
Qty: 100 TABLET | Refills: 0 | Status: SHIPPED | OUTPATIENT
Start: 2023-01-20 | End: 2023-03-11

## 2023-01-20 NOTE — TELEPHONE ENCOUNTER
Recommend appointment in near future - any provider     Recommend start omeprazole (OTC) - 20 mg daily - until being seen .

## 2023-01-20 NOTE — TELEPHONE ENCOUNTER
From: Kathrin Rivera  To: Heather Durand MD  Sent: 1/20/2023 6:14 AM CST  Subject: Questions     Good morning! I have been dealing with stomach problems for 3 weeks now. I got some gasx and probiotics. I've been really gassy. They don't seem to be helping. Anything else I can try or do I need maybe some antibiotics? Not sure what I should do.  Thanks

## 2023-01-20 NOTE — TELEPHONE ENCOUNTER
From: Jesus Rivera  To: Vera Wilson MD  Sent: 1/20/2023 6:14 AM CST  Subject: Questions     Good morning! I have been dealing with stomach problems for 3 weeks now. I got some gasx and probiotics. I've been really gassy. They don't seem to be helping. Anything else I can try or do I need maybe some antibiotics? Not sure what I should do.  Thanks

## 2023-01-21 LAB — H. PYLORI BREATH TEST: NEGATIVE

## 2023-01-23 RX ORDER — SILDENAFIL 50 MG/1
50 TABLET, FILM COATED ORAL
Qty: 24 TABLET | Refills: 0 | Status: SHIPPED | OUTPATIENT
Start: 2023-01-23

## 2023-01-23 NOTE — TELEPHONE ENCOUNTER
Future appt: Your appointments     Date & Time Appointment Department [facility])    Feb 15, 2023  3:00 PM CST Physical - Established with Arlet Haque MD [address], [place] ([facility])            [address], [facility] [place]  [address] [id number]  [phone]        Last Appointment with provider:   1/20/2023  Last appointment at [facility] [place]:  1/20/2023  Cholesterol, Total (mg/dL)   Date Value   04/14/2022 128     HDL Cholesterol (mg/dL)   Date Value   04/14/2022 32 (L)     LDL Cholesterol (mg/dL)   Date Value   04/14/2022 82     Triglycerides (mg/dL)   Date Value   04/14/2022 70     No results found for: EAG, A1C  Lab Results   Component Value Date    T4F 0.8 01/20/2023    TSH 0.852 01/20/2023     Last RF:  9/21/2022    No follow-ups on file.

## 2023-01-24 ENCOUNTER — PATIENT MESSAGE (OUTPATIENT)
Dept: FAMILY MEDICINE CLINIC | Facility: CLINIC | Age: 43
End: 2023-01-24

## 2023-01-25 NOTE — TELEPHONE ENCOUNTER
From: Madhavi Rivera  To: Simone Arvizu  Sent: 1/24/2023 10:46 PM CST  Subject: Question regarding Bun Level    I try to drink a gallon a day of water. That's all I drink at work all day. Could be another reason it's elevated?

## 2023-01-26 ENCOUNTER — PATIENT MESSAGE (OUTPATIENT)
Dept: FAMILY MEDICINE CLINIC | Facility: CLINIC | Age: 43
End: 2023-01-26

## 2023-01-26 ENCOUNTER — TELEPHONE (OUTPATIENT)
Dept: FAMILY MEDICINE CLINIC | Facility: CLINIC | Age: 43
End: 2023-01-26

## 2023-01-26 NOTE — TELEPHONE ENCOUNTER
The patient has opted out of following up on abnormal blood work. The patient's BUN and creatinine are elevated and have been trending up on his blood work. I recommend stopping creatine for one month and checking BUN and Creatinine in one month. Please have the patient be on the look ou for s/s of renal failure which include: swelling, generalized edema especially in the legs, flank pain, headache, increased blood pressure, fatigue, shortness of breath, dizziness, changes in urination such as oliguria and polyuria, and abnormal electrolytes. Acute renal failure may lead to chronic renal failure which may result in permanent kidney damage, and even death.

## 2023-01-26 NOTE — TELEPHONE ENCOUNTER
Pt was advised of below recommendations and results. Pt verbalized understanding. Pt was also advised that he has to make these changes so we can figure out what is making his BUN and creatine levels so elevated.

## 2023-01-26 NOTE — TELEPHONE ENCOUNTER
From: Charline Rivera  To: Ezequiel R. Reather Riedel  Sent: 1/26/2023 11:15 AM CST  Subject: Follow up     OK I will stop being stubborn. I will completely stop the creatine. I will remove 2 scoops of protein per day which brings my protein intake somewhere around 207g. I researched and found contradicting information if it's 1g per Lb of bodyweight or 1g per Kilogram for protein intake. I have my physical scheduled with [facility] February 28th. That's a little over a month from now. Sound good?

## 2023-01-26 NOTE — TELEPHONE ENCOUNTER
Pt wants to know if he should drop his protein down as well or just stop the creatine? ?    Pt was advised of all information in below messsage. Pt verbalized understanding of all below results and recommendations.     Please advise

## 2023-01-26 NOTE — TELEPHONE ENCOUNTER
At this time I would recommend lower his protein intake as the protein may have increased his BUN levels. He can continue to take creatine as it is safe. We have discussed in his office visit that he would decrease his protein powder as it may have been causing his s/s. I recommended stopping creatine to do blood work to ensure that it was the creatine that was causing an increase in his creatinine.

## 2023-01-30 ENCOUNTER — PATIENT MESSAGE (OUTPATIENT)
Dept: FAMILY MEDICINE CLINIC | Facility: CLINIC | Age: 43
End: 2023-01-30

## 2023-01-30 NOTE — TELEPHONE ENCOUNTER
From: Linda Rivera  To: Simone Thompson  Sent: 1/30/2023 11:34 AM CST  Subject: Follow up     Good afternoon. I've Been taking the prescribed medicine and see no change with my stomach. Still farting a lot and bloating. Is it possible im eating somthing im allergic to? Maybe I'm lactose and tolerant, I do eat yogurt everyday. My son is, don't know if it's hereditary. Before I made appointment with you Marybel Helms suggested omeprazole. That do the same thing as the medicine I'm on?

## 2023-01-30 NOTE — TELEPHONE ENCOUNTER
Read and noted we will wait until the patient decides what he wants to do when he comes to allergy testing

## 2023-02-05 ENCOUNTER — PATIENT MESSAGE (OUTPATIENT)
Dept: FAMILY MEDICINE CLINIC | Facility: CLINIC | Age: 43
End: 2023-02-05

## 2023-02-06 NOTE — TELEPHONE ENCOUNTER
From: Javon Rivera  To: Casi Yadav MD  Sent: 2/5/2023 5:49 AM CST  Subject: Questions     I came to the office and got a. Blood test and a bacteria test.  put me on acid reflux medicine. I haven't noticed any change. I'm still farting a lot and stomach feels like I'm constantly hungry, even after eating. I do have a physical scheduled with you the 28th I'm dealing with this issue every day. Anything else I can try to get rid of this?is it possible im eating something I've been allergic to? Lactose and tolerant maybe? I want this to go. Away, been dealing with it for awhile now. Foods I eat are old fashioned Oats, blueberries, raspberries, blackberries, strawberries, Ground Beef, turkey, [place] rice, white rice, onions, peppers, Greek yogurt, tuna, salmon from can, black beans and protein powder. These I have every single day.

## 2023-02-07 ENCOUNTER — PATIENT MESSAGE (OUTPATIENT)
Dept: FAMILY MEDICINE CLINIC | Facility: CLINIC | Age: 43
End: 2023-02-07

## 2023-02-07 NOTE — TELEPHONE ENCOUNTER
From: Luis Rivera  To: Simone Scott [place]  Sent: 2/7/2023 9:59 AM CST  Subject: Question    What's a normal glucose range? I wanna test if I'm insulin resistant. I have belly fat that just won't go away, even when I dropped to 8% body fat at 201lbs. I think from meal to meal my blood levels are still high, therefore storing more fat. I have a blood glucose test at home.

## 2023-02-07 NOTE — TELEPHONE ENCOUNTER
From: Kathleen Rivera  To: Ginny Neal MD  Sent: 2/5/2023 6:14 AM CST  Subject: Question    Sorry for all the messages. I keep forgetting things to ask and I'm just remembered I had more questions. The Dr I seen mentioned I could have ulcar which comes with having this certain bacteria in the stomach, I tested negative for the bacteria, but is it possible I might have an ulcer?

## 2023-02-07 NOTE — TELEPHONE ENCOUNTER
From: Mabel Rivera  To: Iona Garcia MD  Sent: 2/5/2023 6:08 AM CST  Subject: Question    The acid reflux medicine I'm taking, does that have to be taken 1 in morning and 1 at night, or I can take 2 at once. Also does it have to be right before bed, or I can take sooner?

## 2023-02-07 NOTE — TELEPHONE ENCOUNTER
From: Javon Rivera  To: Casi Yadav MD  Sent: 2/5/2023 5:55 AM CST  Subject: Follow up     Forgot to add. I was eating over 4,000 calories and then I just decided to cut fat and I dropped to 2,375 instantly without downstepping.

## 2023-02-13 ENCOUNTER — TELEPHONE (OUTPATIENT)
Dept: FAMILY MEDICINE CLINIC | Facility: CLINIC | Age: 43
End: 2023-02-13

## 2023-02-13 NOTE — TELEPHONE ENCOUNTER
Pt states acid reflux medication he was given is not helping him feel any better, feels no improvement and now has increased hunger, thinks he may have a possible infection - wants to talk to nurse

## 2023-02-17 ENCOUNTER — PATIENT MESSAGE (OUTPATIENT)
Dept: FAMILY MEDICINE CLINIC | Facility: CLINIC | Age: 43
End: 2023-02-17

## 2023-02-17 DIAGNOSIS — R10.13 EPIGASTRIC ABDOMINAL PAIN: ICD-10-CM

## 2023-02-17 RX ORDER — OMEPRAZOLE 40 MG/1
40 CAPSULE, DELAYED RELEASE ORAL DAILY
Qty: 30 CAPSULE | Refills: 0 | Status: SHIPPED | OUTPATIENT
Start: 2023-02-17

## 2023-02-17 RX ORDER — FAMOTIDINE 20 MG/1
20 TABLET, FILM COATED ORAL 2 TIMES DAILY
Qty: 180 TABLET | Refills: 2 | Status: CANCELLED
Start: 2023-02-17 | End: 2023-11-14

## 2023-02-17 NOTE — TELEPHONE ENCOUNTER
Since symptoms did not improve with famotidine, we will do a PPI omeprazole. Please tell patient to stop taking famotidine and swictch to omeprazole. I sent a prescription for 30 days. If improvement he will see it in 2 weeks. Please allow the medication 2 weeks for it to work.

## 2023-02-17 NOTE — TELEPHONE ENCOUNTER
From: Linda Rivera  To: Simone Thompson  Sent: 2/17/2023 6:26 AM CST  Subject: Question    Should I refill my reflux medicine?  Says I can do a partial refill

## 2023-02-21 ENCOUNTER — TELEPHONE (OUTPATIENT)
Dept: FAMILY MEDICINE CLINIC | Facility: CLINIC | Age: 43
End: 2023-02-21

## 2023-02-21 ENCOUNTER — PATIENT MESSAGE (OUTPATIENT)
Dept: FAMILY MEDICINE CLINIC | Facility: CLINIC | Age: 43
End: 2023-02-21

## 2023-02-21 DIAGNOSIS — E55.9 VITAMIN D DEFICIENCY: ICD-10-CM

## 2023-02-21 DIAGNOSIS — Z00.00 HEALTH CARE MAINTENANCE: Primary | ICD-10-CM

## 2023-02-21 DIAGNOSIS — R53.82 CHRONIC FATIGUE: ICD-10-CM

## 2023-02-21 NOTE — TELEPHONE ENCOUNTER
From: Americo Rivera  To: Simone Tyler  Sent: 2/21/2023 9:02 AM CST  Subject: Question     Would it be best to test at my physical fasted?  If so can I get an earlier appointment than 2 for my physical?

## 2023-02-21 NOTE — TELEPHONE ENCOUNTER
Future Appointments   Date Time Provider Tae Lock   2/28/2023 10:00 AM [facility]   2/28/2023  2:30 PM Dang Spaulding MD [facility] [place]     Please enter BW orders for patient upcoming routine annual physical.    Call patient only with questions or problems.

## 2023-02-27 ENCOUNTER — PATIENT MESSAGE (OUTPATIENT)
Dept: FAMILY MEDICINE CLINIC | Facility: CLINIC | Age: 43
End: 2023-02-27

## 2023-02-28 ENCOUNTER — LAB ENCOUNTER (OUTPATIENT)
Dept: LAB | Age: 43
End: 2023-02-28
Attending: FAMILY MEDICINE
Payer: COMMERCIAL

## 2023-02-28 ENCOUNTER — OFFICE VISIT (OUTPATIENT)
Dept: FAMILY MEDICINE CLINIC | Facility: CLINIC | Age: 43
End: 2023-02-28
Payer: COMMERCIAL

## 2023-02-28 VITALS
DIASTOLIC BLOOD PRESSURE: 74 MMHG | HEIGHT: 74.5 IN | HEART RATE: 60 BPM | BODY MASS INDEX: 26.69 KG/M2 | RESPIRATION RATE: 16 BRPM | SYSTOLIC BLOOD PRESSURE: 112 MMHG | WEIGHT: 210.19 LBS | OXYGEN SATURATION: 98 % | TEMPERATURE: 97 F

## 2023-02-28 DIAGNOSIS — R10.13 EPIGASTRIC ABDOMINAL PAIN: ICD-10-CM

## 2023-02-28 DIAGNOSIS — Z00.00 HEALTH CARE MAINTENANCE: ICD-10-CM

## 2023-02-28 DIAGNOSIS — R53.82 CHRONIC FATIGUE: ICD-10-CM

## 2023-02-28 DIAGNOSIS — R79.89 ELEVATED SERUM CREATININE: ICD-10-CM

## 2023-02-28 DIAGNOSIS — Z00.00 HEALTH CARE MAINTENANCE: Primary | ICD-10-CM

## 2023-02-28 DIAGNOSIS — E55.9 VITAMIN D DEFICIENCY: ICD-10-CM

## 2023-02-28 LAB
ALBUMIN SERPL-MCNC: 4.3 G/DL (ref 3.4–5)
ALBUMIN/GLOB SERPL: 1.3 {RATIO} (ref 1–2)
ALP LIVER SERPL-CCNC: 53 U/L
ALT SERPL-CCNC: 35 U/L
ANION GAP SERPL CALC-SCNC: 0 MMOL/L (ref 0–18)
AST SERPL-CCNC: 31 U/L (ref 15–37)
BASOPHILS # BLD AUTO: 0.04 X10(3) UL (ref 0–0.2)
BASOPHILS NFR BLD AUTO: 0.6 %
BILIRUB SERPL-MCNC: 1 MG/DL (ref 0.1–2)
BILIRUB UR QL STRIP.AUTO: NEGATIVE
BUN BLD-MCNC: 21 MG/DL (ref 7–18)
CALCIUM BLD-MCNC: 9.8 MG/DL (ref 8.5–10.1)
CHLORIDE SERPL-SCNC: 104 MMOL/L (ref 98–112)
CHOLEST SERPL-MCNC: 131 MG/DL (ref ?–200)
CLARITY UR REFRACT.AUTO: CLEAR
CO2 SERPL-SCNC: 32 MMOL/L (ref 21–32)
COLOR UR AUTO: COLORLESS
CREAT BLD-MCNC: 1.22 MG/DL
EOSINOPHIL # BLD AUTO: 0.29 X10(3) UL (ref 0–0.7)
EOSINOPHIL NFR BLD AUTO: 4.4 %
ERYTHROCYTE [DISTWIDTH] IN BLOOD BY AUTOMATED COUNT: 12.3 %
FASTING PATIENT LIPID ANSWER: NO
FASTING STATUS PATIENT QL REPORTED: NO
GFR SERPLBLD BASED ON 1.73 SQ M-ARVRAT: 75 ML/MIN/1.73M2 (ref 60–?)
GLOBULIN PLAS-MCNC: 3.4 G/DL (ref 2.8–4.4)
GLUCOSE BLD-MCNC: 86 MG/DL (ref 70–99)
GLUCOSE UR STRIP.AUTO-MCNC: NEGATIVE MG/DL
HCT VFR BLD AUTO: 47.6 %
HDLC SERPL-MCNC: 39 MG/DL (ref 40–59)
HGB BLD-MCNC: 16 G/DL
IMM GRANULOCYTES # BLD AUTO: 0.02 X10(3) UL (ref 0–1)
IMM GRANULOCYTES NFR BLD: 0.3 %
IRON SATN MFR SERPL: 31 %
IRON SERPL-MCNC: 121 UG/DL
KETONES UR STRIP.AUTO-MCNC: NEGATIVE MG/DL
LDLC SERPL CALC-MCNC: 82 MG/DL (ref ?–100)
LEUKOCYTE ESTERASE UR QL STRIP.AUTO: NEGATIVE
LYMPHOCYTES # BLD AUTO: 1.77 X10(3) UL (ref 1–4)
LYMPHOCYTES NFR BLD AUTO: 26.9 %
MCH RBC QN AUTO: 32.1 PG (ref 26–34)
MCHC RBC AUTO-ENTMCNC: 33.6 G/DL (ref 31–37)
MCV RBC AUTO: 95.6 FL
MONOCYTES # BLD AUTO: 0.47 X10(3) UL (ref 0.1–1)
MONOCYTES NFR BLD AUTO: 7.2 %
NEUTROPHILS # BLD AUTO: 3.98 X10 (3) UL (ref 1.5–7.7)
NEUTROPHILS # BLD AUTO: 3.98 X10(3) UL (ref 1.5–7.7)
NEUTROPHILS NFR BLD AUTO: 60.6 %
NITRITE UR QL STRIP.AUTO: NEGATIVE
NONHDLC SERPL-MCNC: 92 MG/DL (ref ?–130)
OSMOLALITY SERPL CALC.SUM OF ELEC: 284 MOSM/KG (ref 275–295)
PH UR STRIP.AUTO: 7 [PH] (ref 5–8)
PLATELET # BLD AUTO: 177 10(3)UL (ref 150–450)
POTASSIUM SERPL-SCNC: 4.1 MMOL/L (ref 3.5–5.1)
PROT SERPL-MCNC: 7.7 G/DL (ref 6.4–8.2)
PROT UR STRIP.AUTO-MCNC: NEGATIVE MG/DL
RBC # BLD AUTO: 4.98 X10(6)UL
RBC UR QL AUTO: NEGATIVE
SODIUM SERPL-SCNC: 136 MMOL/L (ref 136–145)
SP GR UR STRIP.AUTO: 1 (ref 1–1.03)
TESTOST SERPL-MCNC: 556.17 NG/DL
TIBC SERPL-MCNC: 393 UG/DL (ref 240–450)
TRANSFERRIN SERPL-MCNC: 264 MG/DL (ref 200–360)
TRIGL SERPL-MCNC: 43 MG/DL (ref 30–149)
TSI SER-ACNC: 0.59 MIU/ML (ref 0.36–3.74)
UROBILINOGEN UR STRIP.AUTO-MCNC: <2 MG/DL
VIT D+METAB SERPL-MCNC: 57.8 NG/ML (ref 30–100)
VLDLC SERPL CALC-MCNC: 7 MG/DL (ref 0–30)
WBC # BLD AUTO: 6.6 X10(3) UL (ref 4–11)

## 2023-02-28 PROCEDURE — 83540 ASSAY OF IRON: CPT | Performed by: FAMILY MEDICINE

## 2023-02-28 PROCEDURE — 80061 LIPID PANEL: CPT | Performed by: FAMILY MEDICINE

## 2023-02-28 PROCEDURE — 81003 URINALYSIS AUTO W/O SCOPE: CPT | Performed by: FAMILY MEDICINE

## 2023-02-28 PROCEDURE — 80050 GENERAL HEALTH PANEL: CPT | Performed by: FAMILY MEDICINE

## 2023-02-28 PROCEDURE — 84403 ASSAY OF TOTAL TESTOSTERONE: CPT | Performed by: FAMILY MEDICINE

## 2023-02-28 PROCEDURE — 3078F DIAST BP <80 MM HG: CPT | Performed by: FAMILY MEDICINE

## 2023-02-28 PROCEDURE — 82306 VITAMIN D 25 HYDROXY: CPT | Performed by: FAMILY MEDICINE

## 2023-02-28 PROCEDURE — 99396 PREV VISIT EST AGE 40-64: CPT | Performed by: FAMILY MEDICINE

## 2023-02-28 PROCEDURE — 83550 IRON BINDING TEST: CPT | Performed by: FAMILY MEDICINE

## 2023-02-28 PROCEDURE — 3074F SYST BP LT 130 MM HG: CPT | Performed by: FAMILY MEDICINE

## 2023-02-28 PROCEDURE — 3008F BODY MASS INDEX DOCD: CPT | Performed by: FAMILY MEDICINE

## 2023-02-28 NOTE — TELEPHONE ENCOUNTER
From: Rasheeda Jones  Sent: 2/27/2023 7:35 PM CST  To: [facility] Clinical Staff  Subject: Question before labs and physical     Milk also. Perhaps I'm lactose and tolerant?

## 2023-02-28 NOTE — PATIENT INSTRUCTIONS
Good exam      Adjust foods : decrease onions , decrease peppers in diet     Continue with omeprazole for 6 weeks.      Consider protein powders (Scratch , HEED)     Continue with regular exercise and drink plenty of water       Recheck appointment in 2 months

## 2023-03-02 NOTE — TELEPHONE ENCOUNTER
Future appt: Your appointments     Date & Time Appointment Department [facility])    May 02, 2023  3:00 PM CDT Follow Up Visit with Romaine Kerr MD [address], [place] ([place])            [address], [facility]  [address] [id number]  [phone]        Last Appointment with provider:   2/28/2023  Last appointment at [facility]:  2/28/2023  Cholesterol, Total (mg/dL)   Date Value   02/28/2023 131     HDL Cholesterol (mg/dL)   Date Value   02/28/2023 39 (L)     LDL Cholesterol (mg/dL)   Date Value   02/28/2023 82     Triglycerides (mg/dL)   Date Value   02/28/2023 43     No results found for: EAG, A1C  Lab Results   Component Value Date    T4F 0.8 01/20/2023    TSH 0.588 02/28/2023       No follow-ups on file.

## 2023-03-03 ENCOUNTER — PATIENT MESSAGE (OUTPATIENT)
Dept: FAMILY MEDICINE CLINIC | Facility: CLINIC | Age: 43
End: 2023-03-03

## 2023-03-03 DIAGNOSIS — Z71.3: Primary | ICD-10-CM

## 2023-03-03 RX ORDER — SILDENAFIL 50 MG/1
50 TABLET, FILM COATED ORAL
Qty: 24 TABLET | Refills: 3 | Status: SHIPPED | OUTPATIENT
Start: 2023-03-03

## 2023-03-03 RX ORDER — OMEPRAZOLE 40 MG/1
40 CAPSULE, DELAYED RELEASE ORAL DAILY
Qty: 30 CAPSULE | Refills: 2 | Status: SHIPPED | OUTPATIENT
Start: 2023-03-03

## 2023-03-03 NOTE — TELEPHONE ENCOUNTER
From: Katherine Rivera  To: Simone Sanchez  Sent: 3/3/2023 12:11 PM CST  Subject: Question     Does fish and eggs contain the same protein?  Deanna Alegria

## 2023-03-03 NOTE — TELEPHONE ENCOUNTER
Sent referral for dietitian for . Reason for referral is I would like the patient to discuss protein modifications in his diet.

## 2023-03-10 ENCOUNTER — PATIENT MESSAGE (OUTPATIENT)
Dept: FAMILY MEDICINE CLINIC | Facility: CLINIC | Age: 43
End: 2023-03-10

## 2023-03-10 NOTE — TELEPHONE ENCOUNTER
From: Candi Rivera  To: Yair Millan MD  Sent: 3/10/2023 9:24 AM CST  Subject: Question    Good morning! Is this maybe the issue I'm experiencing with stomach? Additionally, creatine supplements can also cause changes in the gut microbiome, leading to an overgrowth of certain bacteria that produce gas as a byproduct of their metabolism.

## 2023-03-11 ENCOUNTER — PATIENT MESSAGE (OUTPATIENT)
Dept: FAMILY MEDICINE CLINIC | Facility: CLINIC | Age: 43
End: 2023-03-11

## 2023-03-11 NOTE — TELEPHONE ENCOUNTER
From: Candi Rivera  To: Yair Millan MD  Sent: 3/11/2023 7:41 AM CST  Subject: Allergy question     Instead of trying to eliminate foods 1 by 1 to see if I have an allergin, do you suggest I go get an allergin test for all foods? Where can I get that done, can I take a for to [facility] to get it done?  Thanks

## 2023-03-12 ENCOUNTER — PATIENT MESSAGE (OUTPATIENT)
Dept: FAMILY MEDICINE CLINIC | Facility: CLINIC | Age: 43
End: 2023-03-12

## 2023-03-13 ENCOUNTER — PATIENT MESSAGE (OUTPATIENT)
Dept: FAMILY MEDICINE CLINIC | Facility: CLINIC | Age: 43
End: 2023-03-13

## 2023-03-13 DIAGNOSIS — R10.9 STOMACH DISCOMFORT: ICD-10-CM

## 2023-03-13 DIAGNOSIS — R10.13 ABDOMINAL DISCOMFORT, EPIGASTRIC: Primary | ICD-10-CM

## 2023-03-13 NOTE — TELEPHONE ENCOUNTER
Pt informed. Pt stated that TR prescribed Omeprazole. Pt states he was told to stay on mediation all of March. Pt has f/u appt with TR 5/2. Pt states he will think about referral to GI and call back if he decides to pursue.       Future Appointments   Date Time Provider Tae Lock   5/2/2023  3:00 PM Matt Vela MD [facility]

## 2023-03-13 NOTE — TELEPHONE ENCOUNTER
From: Linda Rivera  To: Simone Thompson  Sent: 3/13/2023 11:03 AM CDT  Subject: Referral     I will go ahead and take the referral. Thanks!

## 2023-03-13 NOTE — TELEPHONE ENCOUNTER
The symptoms can be addressed by the GI referral sent for the patient. Patient has been complaining of abdominal discomfort for couple months now, time for her to see a GI physician.

## 2023-03-13 NOTE — TELEPHONE ENCOUNTER
I recommend a GI consult as the patient may require an EGD or a colonoscopy; please let me know if the patient is agreeable.

## 2023-03-13 NOTE — TELEPHONE ENCOUNTER
From: Carlos Rivera  To: Simone CANDIDARosa Asadradha Charissa  Sent: 3/12/2023 12:16 PM CDT  Subject: Question     I was just wondering, I know in our appointment you had mentioned an ulcer caused by a bacteria. I tested for it and it was negative. I did last year have blood clots that one time I was on ibprofin for awhile and another I was on 325 mg aspirin that the vein clinic put me on. Is that possible I got an ulcer from that medicine? Ppi im on will cLear that up? I also forgot to mention I've had dark stools. Thanks! I was thinking it was an intolerance or allergan to something I'm eating, is that possible too, should I take away foods each week and check?

## 2023-03-14 RX ORDER — SILDENAFIL 50 MG/1
50 TABLET, FILM COATED ORAL
Qty: 24 TABLET | Refills: 0 | OUTPATIENT
Start: 2023-03-14

## 2023-03-17 ENCOUNTER — PATIENT MESSAGE (OUTPATIENT)
Dept: FAMILY MEDICINE CLINIC | Facility: CLINIC | Age: 43
End: 2023-03-17

## 2023-03-17 NOTE — TELEPHONE ENCOUNTER
From: Kathleen Rivera  To: Ginny Neal MD  Sent: 3/17/2023 12:49 PM CDT  Subject: Medicine question     You want me to stay on my medicine for all, of March then stop it right, or through April as well?

## 2023-03-17 NOTE — TELEPHONE ENCOUNTER
Future Appointments   Date Time Provider Tae Ochoai   5/2/2023  3:00 PM Shena Newby MD [facility] [place] [facility] [place]     Informed pt that according to Dr. Richie Sagastume office note, he was to continue with omeprazole for 6 weeks and then follow up at the 2 month randal with Dr. Halley Velez. Appointment is set as stated above. Pt states that he does not believe that his symptoms are improving, but wants to give the medication some time to work. He has not called for GI referral due to finances. Routing to provider covering for Dr. Halley Velez for review. No further questions or concerns at this time.

## 2023-04-04 ENCOUNTER — OFFICE VISIT (OUTPATIENT)
Dept: FAMILY MEDICINE CLINIC | Facility: CLINIC | Age: 43
End: 2023-04-04
Payer: COMMERCIAL

## 2023-04-04 VITALS
RESPIRATION RATE: 18 BRPM | DIASTOLIC BLOOD PRESSURE: 78 MMHG | SYSTOLIC BLOOD PRESSURE: 118 MMHG | HEART RATE: 92 BPM | OXYGEN SATURATION: 97 % | TEMPERATURE: 97 F

## 2023-04-04 DIAGNOSIS — Z12.11 ENCOUNTER FOR SCREENING COLONOSCOPY: ICD-10-CM

## 2023-04-04 DIAGNOSIS — K21.9 GASTROESOPHAGEAL REFLUX DISEASE WITHOUT ESOPHAGITIS: ICD-10-CM

## 2023-04-04 DIAGNOSIS — J02.9 SORE THROAT: ICD-10-CM

## 2023-04-04 DIAGNOSIS — H65.193 OTHER NON-RECURRENT ACUTE NONSUPPURATIVE OTITIS MEDIA OF BOTH EARS: Primary | ICD-10-CM

## 2023-04-04 LAB
CONTROL LINE PRESENT WITH A CLEAR BACKGROUND (YES/NO): YES YES/NO
KIT LOT #: NORMAL NUMERIC
STREP GRP A CUL-SCR: NEGATIVE

## 2023-04-04 PROCEDURE — 99214 OFFICE O/P EST MOD 30 MIN: CPT

## 2023-04-04 PROCEDURE — 3078F DIAST BP <80 MM HG: CPT

## 2023-04-04 PROCEDURE — 87637 SARSCOV2&INF A&B&RSV AMP PRB: CPT

## 2023-04-04 PROCEDURE — 3074F SYST BP LT 130 MM HG: CPT

## 2023-04-04 PROCEDURE — 87880 STREP A ASSAY W/OPTIC: CPT

## 2023-04-04 RX ORDER — AZITHROMYCIN 250 MG/1
TABLET, FILM COATED ORAL
Qty: 6 TABLET | Refills: 0 | Status: SHIPPED | OUTPATIENT
Start: 2023-04-04 | End: 2023-04-09

## 2023-04-05 LAB
FLUAV + FLUBV RNA SPEC NAA+PROBE: NOT DETECTED
FLUAV + FLUBV RNA SPEC NAA+PROBE: NOT DETECTED
RSV RNA SPEC NAA+PROBE: NOT DETECTED
SARS-COV-2 RNA RESP QL NAA+PROBE: NOT DETECTED

## 2023-04-06 ENCOUNTER — PATIENT MESSAGE (OUTPATIENT)
Dept: FAMILY MEDICINE CLINIC | Facility: CLINIC | Age: 43
End: 2023-04-06

## 2023-04-06 DIAGNOSIS — R06.2 WHEEZING: ICD-10-CM

## 2023-04-06 RX ORDER — ALBUTEROL SULFATE 90 UG/1
1 AEROSOL, METERED RESPIRATORY (INHALATION) EVERY 4 HOURS PRN
Qty: 8.5 G | Refills: 0 | Status: SHIPPED | OUTPATIENT
Start: 2023-04-06

## 2023-04-06 NOTE — TELEPHONE ENCOUNTER
From: Malinda Claude A Russom  To: Simone Ibrahim  Sent: 2023 11:46 AM CDT  Subject: Pro air refill     Good afternoon. I just noticed my inhaler  ,is it still OK to use? Is that what maybe caused the blisters in my mouth?  I put in a prescription refill request

## 2023-04-17 ENCOUNTER — TELEPHONE (OUTPATIENT)
Dept: FAMILY MEDICINE CLINIC | Facility: CLINIC | Age: 43
End: 2023-04-17

## 2023-04-17 NOTE — TELEPHONE ENCOUNTER
Pt took last omeprazole today and wanted to know if he should continue taking it or not? Pt was seen in February and advised to only take it for 6 weeks but also has 2 refills on it. Pt stated it was helping but he changed his diet too. Pt has not made appointment to see GI for colonoscopy and reflux, stating he won't make appointment until he turns 37 for colonoscopy. Advised pt GI would manage reflux sx and can do egd/colon at the same time.

## 2023-04-21 ENCOUNTER — PATIENT MESSAGE (OUTPATIENT)
Dept: FAMILY MEDICINE CLINIC | Facility: CLINIC | Age: 43
End: 2023-04-21

## 2023-04-21 DIAGNOSIS — K21.9 GASTROESOPHAGEAL REFLUX DISEASE WITHOUT ESOPHAGITIS: Primary | ICD-10-CM

## 2023-04-24 ENCOUNTER — PATIENT MESSAGE (OUTPATIENT)
Dept: FAMILY MEDICINE CLINIC | Facility: CLINIC | Age: 43
End: 2023-04-24

## 2023-04-24 RX ORDER — OMEPRAZOLE 40 MG/1
40 CAPSULE, DELAYED RELEASE ORAL DAILY
Qty: 90 CAPSULE | Refills: 1 | Status: SHIPPED | OUTPATIENT
Start: 2023-04-24

## 2023-05-01 NOTE — TELEPHONE ENCOUNTER
From: Fish Rivera  To: Ori Ortiz MD  Sent: 4/24/2023 9:26 AM CDT  Subject: Medicine refill     [facility]. My insurance wants me to [facility]. Can you please send the refill to [facility] [employer]  [address] [facility], [address]. Thanks! The acid reducer medicine.  I need 90 day prescription

## 2023-05-02 ENCOUNTER — TELEMEDICINE (OUTPATIENT)
Dept: FAMILY MEDICINE CLINIC | Facility: CLINIC | Age: 43
End: 2023-05-02
Payer: COMMERCIAL

## 2023-05-02 ENCOUNTER — PATIENT MESSAGE (OUTPATIENT)
Dept: FAMILY MEDICINE CLINIC | Facility: CLINIC | Age: 43
End: 2023-05-02

## 2023-05-02 VITALS — HEIGHT: 74.5 IN | WEIGHT: 210 LBS | BODY MASS INDEX: 26.66 KG/M2

## 2023-05-02 DIAGNOSIS — K21.9 GASTROESOPHAGEAL REFLUX DISEASE WITHOUT ESOPHAGITIS: Primary | ICD-10-CM

## 2023-05-02 PROCEDURE — 99213 OFFICE O/P EST LOW 20 MIN: CPT | Performed by: FAMILY MEDICINE

## 2023-05-02 NOTE — TELEPHONE ENCOUNTER
Future Appointments   Date Time Provider Tae Lock   5/2/2023  3:00 PM Katelyn Jiang MD [facility] [place] [facility] [place]     Appt changed to video visit, but give patient a call back to answer his [employer] message questions.

## 2023-05-02 NOTE — TELEPHONE ENCOUNTER
Advised pt yes Dr. Xiong Persons wants pt to keep appt today and will discuss sx and plan of treatment at that time. Pt understands and is agreeable.

## 2023-05-02 NOTE — TELEPHONE ENCOUNTER
From: Hiro Rivera  To: David Dent MD  Sent: 5/2/2023 11:15 AM CDT  Subject: Appointment     Good afternoon. I'm not gonna be able to make appointment today. What was it Glendamarija Rowdy wanted to discuss with me. Virtual visit today at 3 work? Did he want to try me on different medicines, or other tests?

## 2023-05-08 ENCOUNTER — PATIENT MESSAGE (OUTPATIENT)
Dept: FAMILY MEDICINE CLINIC | Facility: CLINIC | Age: 43
End: 2023-05-08

## 2023-05-08 NOTE — TELEPHONE ENCOUNTER
Hi Linda Proper,    It may prevent you from catching it. Is that something you would like to do? Please schedule a telephone or video visit if that is something you are willing to discuss and I can put the order for shingles. Remember immunity would have to be 2-3 weeks after the start of the shingles vaccine. You may be exposed prior or during that time. Are you sure you never got Chicken Pox? I am off tomorrow, so please pardon my delay in response. Thank you,    Lilliam Boyer MSN, APRN, FNP-BC, PCCN.

## 2023-05-08 NOTE — TELEPHONE ENCOUNTER
From: Bahman Rivera  To: Simone Gibson  Sent: 5/8/2023 1:07 PM CDT  Subject: Question     I remember a while back I was itching head to toe, but I have exzema. Is that something that spreads over whole body, or maybe that was chicken pox?  I went to dermatologist at that time

## 2023-05-08 NOTE — TELEPHONE ENCOUNTER
Donato Black,    No eczema and the vesicles that appear with chicken pox are distinctly different from each other. The dermatologist would have made the distinction. Hope that helps. Take Care,    [facility] MSN, APRN, FNP-BC, PCCN. Emma Morgan

## 2023-05-09 ENCOUNTER — TELEPHONE (OUTPATIENT)
Dept: FAMILY MEDICINE CLINIC | Facility: CLINIC | Age: 43
End: 2023-05-09

## 2023-05-09 NOTE — TELEPHONE ENCOUNTER
Wife has shingles  don't remenber if he(Wayne) had the chicken pox . Suggestions? No future appointments.

## 2023-07-24 ENCOUNTER — PATIENT MESSAGE (OUTPATIENT)
Dept: FAMILY MEDICINE CLINIC | Facility: CLINIC | Age: 43
End: 2023-07-24

## 2023-07-24 NOTE — TELEPHONE ENCOUNTER
From: Rosa Rivera  To: Scout Quezada MD  Sent: 7/24/2023 8:31 AM CDT  Subject: Medicine refill? Good morning! I ran out of the acid reducing medicine I was taking. Took the last one yesterday. Didn't know if you wanted me to get it refilled, or try without it now. I ddint notice it making any difference. Still have the gas issue.

## 2023-07-24 NOTE — TELEPHONE ENCOUNTER
Please discontinue the omeprazole. If the gas symptoms get worse, restart the omeprazole and contact Dr. Darlene Rendon.

## 2023-07-24 NOTE — TELEPHONE ENCOUNTER
Pt had Telemed visit with TR on 5/2- due to GERD s/s. Pt was advised then to continue with Omeprazole. Pt contacted-  Pt has continued gas, pt can't tell if it has improved with taking Omeprazole. Pt states he has taken Omeprazole for 4 months now. Pt not uncomfortable. No abdominal pain reported. No Acid reflux reported. No nausea reported. Pt does take protein powder supplement- pt will review diet to see if that can be contributing. Pt states he doesn't want to continue with Omeprazole if it is now necessary. Pt states if he stops and notices s/s were actually better while taking, then pt states he can restart. Pt was given GI referral back in April- pt is not plans on going to GI at this time. Routed to [place] covering for TR.

## 2023-08-16 NOTE — TELEPHONE ENCOUNTER
Future appt:    Last Appointment with provider:   2/28/2023  Last appointment at [facility]:  4/4/2023  Cholesterol, Total (mg/dL)   Date Value   02/28/2023 131     HDL Cholesterol (mg/dL)   Date Value   02/28/2023 39 (L)     LDL Cholesterol (mg/dL)   Date Value   02/28/2023 82     Triglycerides (mg/dL)   Date Value   02/28/2023 43     No results found for: EAG, A1C  Lab Results   Component Value Date    T4F 0.8 01/20/2023    TSH 0.588 02/28/2023     Last RF:  3/3/2023    No follow-ups on file.

## 2023-08-17 RX ORDER — SILDENAFIL 50 MG/1
50 TABLET, FILM COATED ORAL
Qty: 24 TABLET | Refills: 3 | Status: SHIPPED | OUTPATIENT
Start: 2023-08-17

## 2023-09-16 ENCOUNTER — PATIENT MESSAGE (OUTPATIENT)
Dept: FAMILY MEDICINE CLINIC | Facility: CLINIC | Age: 43
End: 2023-09-16

## 2023-09-18 NOTE — TELEPHONE ENCOUNTER
From: Dorean Fothergill Russom  To: Kayla Pickering  Sent: 9/16/2023 6:44 AM CDT  Subject: Question    Good Morning! I have a question. My wife came in and she was told she had a yeast infection. I have like a weard burning sensation,kasia sensitive rubbing against my underwear. Is that maybe a yeast infection, I'm not itching though. I'm at work today, get off at 3 pm, but I will see messages and answer. Thanks!

## 2023-09-19 NOTE — TELEPHONE ENCOUNTER
Chart reviewed     I do not feel that symptoms are related to yeast infection     Recommend good hygiene - recommend use mild soap - Dove or Purpose or Noxzema

## (undated) NOTE — LETTER
06/11/18        Alisia Rivera  [address] [place] [address]      Dear Bentley Sanchez,    [facility] records indicate that you have outstanding lab work and or testing that was ordered for you and has not yet been completed:          CBC W Differential W Plate